# Patient Record
Sex: MALE | Race: WHITE | Employment: PART TIME | URBAN - METROPOLITAN AREA
[De-identification: names, ages, dates, MRNs, and addresses within clinical notes are randomized per-mention and may not be internally consistent; named-entity substitution may affect disease eponyms.]

---

## 2017-04-27 ENCOUNTER — TELEPHONE (OUTPATIENT)
Dept: FAMILY MEDICINE CLINIC | Age: 62
End: 2017-04-27

## 2017-05-22 ENCOUNTER — OFFICE VISIT (OUTPATIENT)
Dept: FAMILY MEDICINE CLINIC | Age: 62
End: 2017-05-22

## 2017-05-22 VITALS
HEART RATE: 70 BPM | TEMPERATURE: 98.1 F | WEIGHT: 184 LBS | DIASTOLIC BLOOD PRESSURE: 70 MMHG | HEIGHT: 70 IN | RESPIRATION RATE: 16 BRPM | SYSTOLIC BLOOD PRESSURE: 118 MMHG | BODY MASS INDEX: 26.34 KG/M2

## 2017-05-22 DIAGNOSIS — Z12.11 SCREENING FOR COLON CANCER: ICD-10-CM

## 2017-05-22 DIAGNOSIS — K21.9 GASTROESOPHAGEAL REFLUX DISEASE, ESOPHAGITIS PRESENCE NOT SPECIFIED: ICD-10-CM

## 2017-05-22 DIAGNOSIS — E78.5 HYPERLIPIDEMIA LDL GOAL <130: ICD-10-CM

## 2017-05-22 DIAGNOSIS — S29.019A THORACIC MYOFASCIAL STRAIN, INITIAL ENCOUNTER: ICD-10-CM

## 2017-05-22 DIAGNOSIS — I10 ESSENTIAL HYPERTENSION: Primary | ICD-10-CM

## 2017-05-22 DIAGNOSIS — Z72.0 TOBACCO ABUSE: ICD-10-CM

## 2017-05-22 LAB
CHOLESTEROL, TOTAL: 181 MG/DL (ref 0–199)
HDLC SERPL-MCNC: 35 MG/DL (ref 40–60)
LDL CHOLESTEROL CALCULATED: 124 MG/DL
TRIGL SERPL-MCNC: 108 MG/DL (ref 0–150)
VLDLC SERPL CALC-MCNC: 22 MG/DL

## 2017-05-22 PROCEDURE — 99214 OFFICE O/P EST MOD 30 MIN: CPT | Performed by: FAMILY MEDICINE

## 2017-05-22 RX ORDER — LISINOPRIL AND HYDROCHLOROTHIAZIDE 25; 20 MG/1; MG/1
TABLET ORAL
Qty: 90 TABLET | Refills: 1 | Status: SHIPPED | OUTPATIENT
Start: 2017-05-22 | End: 2017-09-27 | Stop reason: SDUPTHER

## 2017-05-22 RX ORDER — PANTOPRAZOLE SODIUM 40 MG/1
40 TABLET, DELAYED RELEASE ORAL DAILY
Qty: 30 TABLET | Refills: 3 | Status: SHIPPED | OUTPATIENT
Start: 2017-05-22 | End: 2021-10-08

## 2017-06-14 ENCOUNTER — TELEPHONE (OUTPATIENT)
Dept: OTHER | Facility: CLINIC | Age: 62
End: 2017-06-14

## 2017-09-27 DIAGNOSIS — I10 ESSENTIAL HYPERTENSION: ICD-10-CM

## 2017-09-27 RX ORDER — LISINOPRIL AND HYDROCHLOROTHIAZIDE 25; 20 MG/1; MG/1
TABLET ORAL
Qty: 90 TABLET | Refills: 0 | Status: SHIPPED | OUTPATIENT
Start: 2017-09-27 | End: 2018-01-04 | Stop reason: SDUPTHER

## 2017-11-27 ENCOUNTER — OFFICE VISIT (OUTPATIENT)
Dept: FAMILY MEDICINE CLINIC | Age: 62
End: 2017-11-27

## 2017-11-27 VITALS
SYSTOLIC BLOOD PRESSURE: 118 MMHG | HEART RATE: 66 BPM | RESPIRATION RATE: 16 BRPM | WEIGHT: 180 LBS | BODY MASS INDEX: 25.77 KG/M2 | DIASTOLIC BLOOD PRESSURE: 70 MMHG | HEIGHT: 70 IN | TEMPERATURE: 98.1 F

## 2017-11-27 DIAGNOSIS — Z00.00 WELL ADULT HEALTH CHECK: Primary | ICD-10-CM

## 2017-11-27 DIAGNOSIS — E78.5 HYPERLIPIDEMIA LDL GOAL <130: ICD-10-CM

## 2017-11-27 DIAGNOSIS — K21.9 GASTROESOPHAGEAL REFLUX DISEASE, ESOPHAGITIS PRESENCE NOT SPECIFIED: ICD-10-CM

## 2017-11-27 DIAGNOSIS — I10 ESSENTIAL HYPERTENSION: ICD-10-CM

## 2017-11-27 DIAGNOSIS — Z12.11 SCREENING FOR COLON CANCER: ICD-10-CM

## 2017-11-27 DIAGNOSIS — Z23 NEEDS FLU SHOT: ICD-10-CM

## 2017-11-27 DIAGNOSIS — Z72.0 TOBACCO ABUSE: ICD-10-CM

## 2017-11-27 DIAGNOSIS — E55.9 VITAMIN D DEFICIENCY: ICD-10-CM

## 2017-11-27 DIAGNOSIS — F17.219 NICOTINE DEPENDENCE, CIGARETTES, WITH UNSPECIFIED NICOTINE-INDUCED DISORDERS: ICD-10-CM

## 2017-11-27 PROCEDURE — G0296 VISIT TO DETERM LDCT ELIG: HCPCS | Performed by: FAMILY MEDICINE

## 2017-11-27 PROCEDURE — 90630 INFLUENZA, QUADV, 18-64 YRS, ID, PF, MICRO INJ, 0.1ML (FLUZONE QUADV, PF): CPT | Performed by: FAMILY MEDICINE

## 2017-11-27 PROCEDURE — 90471 IMMUNIZATION ADMIN: CPT | Performed by: FAMILY MEDICINE

## 2017-11-27 PROCEDURE — 99396 PREV VISIT EST AGE 40-64: CPT | Performed by: FAMILY MEDICINE

## 2017-11-27 ASSESSMENT — PATIENT HEALTH QUESTIONNAIRE - PHQ9
1. LITTLE INTEREST OR PLEASURE IN DOING THINGS: 0
SUM OF ALL RESPONSES TO PHQ9 QUESTIONS 1 & 2: 0
2. FEELING DOWN, DEPRESSED OR HOPELESS: 0
SUM OF ALL RESPONSES TO PHQ QUESTIONS 1-9: 0

## 2017-11-27 NOTE — PROGRESS NOTES
CHART REVIEW  Health Maintenance   Topic Date Due    Colon cancer screen colonoscopy  11/11/2005    Smoker: low dose lung CT screening  11/11/2010    Flu vaccine (1) 09/01/2017    Lipid screen  05/22/2022    DTaP/Tdap/Td vaccine (3 - Td) 07/16/2025    Zostavax vaccine  Completed    Pneumococcal med risk  Completed    Hepatitis C screen  Completed    HIV screen  Completed      Patient Active Problem List   Diagnosis    Vitamin D deficiency    Screening for colon cancer    Screening for prostate cancer    Hyperlipidemia LDL goal <130    GERD (gastroesophageal reflux disease)    Hypertension    Tobacco abuse    Well adult health check    Insomnia    Snoring    Wheezing    ED (erectile dysfunction)    Thoracic myofascial strain     Prior to Visit Medications    Medication Sig Taking? Authorizing Provider   lisinopril-hydrochlorothiazide (PRINZIDE;ZESTORETIC) 20-25 MG per tablet TAKE ONE TABLET BY MOUTH DAILY Yes Lottie Mendoza MD   pantoprazole (PROTONIX) 40 MG tablet Take 1 tablet by mouth daily Yes Chantell Haas MD   vitamin D (CHOLECALCIFEROL) 5000 UNITS CAPS capsule Take 1 capsule by mouth daily Yes Chantell Haas MD   aspirin EC 81 MG EC tablet Take 1 tablet by mouth daily.  Yes Chantell Haas MD     Cholesterol, Total   Date Value Ref Range Status   05/22/2017 181 0 - 199 mg/dL Final     LDL Calculated   Date Value Ref Range Status   05/22/2017 124 (H) <100 mg/dL Final     HDL   Date Value Ref Range Status   05/22/2017 35 (L) 40 - 60 mg/dL Final   12/23/2011 31 (L) 40 - 60 mg/dl Final     Triglycerides   Date Value Ref Range Status   05/22/2017 108 0 - 150 mg/dL Final     Lab Results   Component Value Date    GLUCOSE 91 11/22/2016     Lab Results   Component Value Date     11/22/2016    K 4.1 11/22/2016    CREATININE 0.9 11/22/2016     Lab Results   Component Value Date    WBC 8.0 01/06/2016    HGB 16.6 01/06/2016    HCT 49.4 01/06/2016    MCV 92.7 01/06/2016     01/06/2016 187 lb (84.8 kg)   06/08/16 188 lb (85.3 kg)   01/06/16 188 lb (85.3 kg)      GENERAL:   · well-developed, well-nourished, alert, no distress. EYES: glasses  · External findings: lids and lashes normal and conjunctivae and sclerae normal  · Eyes: no periorbital cellulitis. ENT:   · External nose and ears appear normal  · normal TM's and external ear canals both ears  · Pharynx: normal. Exudates: None  · Lips, mucosa, and tongue normal   · Hearing grossly normal.     NECK:   · No adenopathy, supple, symmetrical, trachea midline  · Thyroid not enlarged, symmetric, no tenderness/mass/nodules  LYMPH:  · no cervical nodes, no supraclavicular nodes  LUNGS:    · Breathing unlabored  · clear to auscultation bilaterally and good air movement  CARDIOVASC:   · regular rate and rhythm, S1, S2 normal. No murmur, click, rub or gallop  · Apical impulse normal  · LEGS:  Lower extremity edema: none    · No carotid bruits  ABDOMEN:   · Soft, non-tender, no masses  · No hepatosplenomegaly  · No hernias noted. Exam limited by N/A  SKIN: warm and dry  · No rashes or suspicious lesions  · No nodules or induration  PSYCH:    · Alert and oriented  · Normal reasoning, insight good  · Facial expressions full, mood appropriate  · No memory disturbance noted  MUSCULOSKEL:    · Gait normal, assistive device: none  · No significant finger or nail findings  · Spine symmetric, no deformities, no kyphosis      Assessment and Plan:     1. Well adult health check     2. Needs flu shot  INFLUENZA, QUADV, 18-64 YRS, ID, PF, MICRO INJ, 0.1ML (FLUZONE QUADV, PF)   3. Essential hypertension     4. Tobacco abuse     5. Vitamin D deficiency     6. Hyperlipidemia LDL goal <130     7. Gastroesophageal reflux disease, esophagitis presence not specified     8. Nicotine dependence, cigarettes, with unspecified nicotine-induced disorders  TN VISIT TO DISCUSS LUNG CA SCREEN W LDCT    CT Lung Screening   9.  Screening for colon cancer  [facility] COLONOSCOPY     RISK comorbidities resulting in life expectancy of < 10 years, or that would preclude treatment of an abnormality identified on CT, should not be screened due to lack of benefit.     To obtain maximal benefit from this screening, smoking cessation and long-term abstinence from smoking is critical        ·

## 2017-11-27 NOTE — PATIENT INSTRUCTIONS
INSTRUCTIONS  · NEXT APPOINTMENT: Please schedule fasting check-up in 6 months. OK to have water, black coffee and medications. · PLEASE TAKE THIS FORM TO CHECK-OUT WINDOW TO SCHEDULE NEXT VISIT.   · REFILL POLICY:  If not getting refills today, then PLEASE make next appointment on way out today. Will need to see that future appointment scheudled when pharmacy contacts Dr. Reggie Ash for a refill. · Get CT lung scan for lung cancer screening. · Patient advised to quit smoking and assistance in doing so was offered. Call GI to schedule colonoscopy soon for colon cancer screening and prevention. Will need to do bowel prep the day before and someone to drive home afterwards. ·    Patient Education     Low Dose CT (LDCT) Lung Screening criteria met   Age 50-69   Pack year smoking >30   Still smoking or less than 15 year since quit   No sign or symptoms of lung cancer   > 11 months since last LDCT     Risks and benefits of lung cancer screening with LDCT scans discussed:    Significance of positive screen - False-positive LDCT results often occur. 95% of all positive results do not lead to a diagnosis of cancer. Usually further imaging can resolve most false-positive results; however, some patients may require invasive procedures. Over diagnosis risk - 10% to 12% of screen-detected lung cancer cases are over diagnosedthat is, the cancer would not have been detected in the patient's lifetime without the screening. Need for follow up screens annually to continue lung cancer screening effectiveness     Risks associated with radiation from annual LDCT- Radiation exposure is about the same as for a mammogram, which is about 1/3 of the annual background radiation exposure from everyday life. Starting screening at age 54 is not likely to increase cancer risk from radiation exposure.     Patients with comorbidities resulting in life expectancy of < 10 years, or that would preclude treatment of an abnormality identified on CT, should not be screened due to lack of benefit.     To obtain maximal benefit from this screening, smoking cessation and long-term abstinence from smoking is critical

## 2017-11-27 NOTE — PROGRESS NOTES
Vaccine Information Sheet, \"Influenza - Inactivated\"  given to Fatou Trujillos, or parent/legal guardian of  Fatou Trujillos and verbalized understanding. Patient responses:    Have you ever had a reaction to a flu vaccine? No  Are you able to eat eggs without adverse effects? No  Do you have any current illness? No  Have you ever had [place] Syndrome? No    Flu vaccine given per order. Please see immunization tab.

## 2018-01-04 DIAGNOSIS — I10 ESSENTIAL HYPERTENSION: ICD-10-CM

## 2018-01-04 RX ORDER — LISINOPRIL AND HYDROCHLOROTHIAZIDE 25; 20 MG/1; MG/1
TABLET ORAL
Qty: 90 TABLET | Refills: 0 | Status: SHIPPED | OUTPATIENT
Start: 2018-01-04 | End: 2018-04-13 | Stop reason: SDUPTHER

## 2018-04-11 ENCOUNTER — TELEPHONE (OUTPATIENT)
Dept: FAMILY MEDICINE CLINIC | Age: 63
End: 2018-04-11

## 2018-04-13 DIAGNOSIS — I10 ESSENTIAL HYPERTENSION: ICD-10-CM

## 2018-04-13 RX ORDER — LISINOPRIL AND HYDROCHLOROTHIAZIDE 25; 20 MG/1; MG/1
TABLET ORAL
Qty: 90 TABLET | Refills: 0 | Status: SHIPPED | OUTPATIENT
Start: 2018-04-13 | End: 2018-07-20 | Stop reason: SDUPTHER

## 2018-04-17 ENCOUNTER — OFFICE VISIT (OUTPATIENT)
Dept: FAMILY MEDICINE CLINIC | Age: 63
End: 2018-04-17

## 2018-04-17 VITALS
SYSTOLIC BLOOD PRESSURE: 122 MMHG | OXYGEN SATURATION: 96 % | WEIGHT: 189 LBS | RESPIRATION RATE: 16 BRPM | TEMPERATURE: 98 F | HEART RATE: 60 BPM | DIASTOLIC BLOOD PRESSURE: 70 MMHG | BODY MASS INDEX: 27.06 KG/M2 | HEIGHT: 70 IN

## 2018-04-17 DIAGNOSIS — R06.83 SNORING: ICD-10-CM

## 2018-04-17 DIAGNOSIS — Z12.11 SCREENING FOR COLON CANCER: ICD-10-CM

## 2018-04-17 DIAGNOSIS — I10 ESSENTIAL HYPERTENSION: ICD-10-CM

## 2018-04-17 DIAGNOSIS — R40.0 DAYTIME SLEEPINESS: ICD-10-CM

## 2018-04-17 DIAGNOSIS — E55.9 VITAMIN D DEFICIENCY: ICD-10-CM

## 2018-04-17 DIAGNOSIS — Z72.0 TOBACCO ABUSE: ICD-10-CM

## 2018-04-17 DIAGNOSIS — E78.5 HYPERLIPIDEMIA LDL GOAL <130: ICD-10-CM

## 2018-04-17 DIAGNOSIS — Z00.00 WELL ADULT HEALTH CHECK: Primary | ICD-10-CM

## 2018-04-17 LAB
A/G RATIO: 1.9 (ref 1.1–2.2)
ALBUMIN SERPL-MCNC: 4.1 G/DL (ref 3.4–5)
ALP BLD-CCNC: 70 U/L (ref 40–129)
ALT SERPL-CCNC: 14 U/L (ref 10–40)
ANION GAP SERPL CALCULATED.3IONS-SCNC: 13 MMOL/L (ref 3–16)
AST SERPL-CCNC: 14 U/L (ref 15–37)
BILIRUB SERPL-MCNC: 0.6 MG/DL (ref 0–1)
BUN BLDV-MCNC: 16 MG/DL (ref 7–20)
CALCIUM SERPL-MCNC: 8.8 MG/DL (ref 8.3–10.6)
CHLORIDE BLD-SCNC: 104 MMOL/L (ref 99–110)
CHOLESTEROL, TOTAL: 164 MG/DL (ref 0–199)
CO2: 26 MMOL/L (ref 21–32)
CREAT SERPL-MCNC: 0.9 MG/DL (ref 0.8–1.3)
GFR AFRICAN AMERICAN: >60
GFR NON-AFRICAN AMERICAN: >60
GLOBULIN: 2.2 G/DL
GLUCOSE BLD-MCNC: 85 MG/DL (ref 70–99)
HDLC SERPL-MCNC: 30 MG/DL (ref 40–60)
LDL CHOLESTEROL CALCULATED: 114 MG/DL
POTASSIUM SERPL-SCNC: 4.2 MMOL/L (ref 3.5–5.1)
SODIUM BLD-SCNC: 143 MMOL/L (ref 136–145)
TOTAL PROTEIN: 6.3 G/DL (ref 6.4–8.2)
TRIGL SERPL-MCNC: 98 MG/DL (ref 0–150)
VITAMIN D 25-HYDROXY: 23.5 NG/ML
VLDLC SERPL CALC-MCNC: 20 MG/DL

## 2018-04-17 PROCEDURE — 99396 PREV VISIT EST AGE 40-64: CPT | Performed by: FAMILY MEDICINE

## 2018-04-17 RX ORDER — ATORVASTATIN CALCIUM 20 MG/1
20 TABLET, FILM COATED ORAL DAILY
Qty: 90 TABLET | Refills: 3 | Status: SHIPPED | OUTPATIENT
Start: 2018-04-17 | End: 2019-04-24

## 2018-04-18 DIAGNOSIS — E55.9 VITAMIN D DEFICIENCY: ICD-10-CM

## 2018-04-24 ENCOUNTER — TELEPHONE (OUTPATIENT)
Dept: FAMILY MEDICINE CLINIC | Age: 63
End: 2018-04-24

## 2018-06-12 ENCOUNTER — TELEPHONE (OUTPATIENT)
Dept: FAMILY MEDICINE CLINIC | Age: 63
End: 2018-06-12

## 2018-06-12 DIAGNOSIS — Z72.0 TOBACCO ABUSE: ICD-10-CM

## 2018-06-12 DIAGNOSIS — R06.2 WHEEZING: Primary | ICD-10-CM

## 2018-07-20 DIAGNOSIS — I10 ESSENTIAL HYPERTENSION: ICD-10-CM

## 2018-07-20 RX ORDER — LISINOPRIL AND HYDROCHLOROTHIAZIDE 25; 20 MG/1; MG/1
TABLET ORAL
Qty: 90 TABLET | Refills: 0 | Status: SHIPPED | OUTPATIENT
Start: 2018-07-20 | End: 2018-07-23

## 2018-07-22 DIAGNOSIS — I10 ESSENTIAL HYPERTENSION: ICD-10-CM

## 2018-07-23 RX ORDER — LISINOPRIL AND HYDROCHLOROTHIAZIDE 25; 20 MG/1; MG/1
TABLET ORAL
Qty: 90 TABLET | Refills: 0 | Status: SHIPPED | OUTPATIENT
Start: 2018-07-23 | End: 2019-03-12 | Stop reason: SDUPTHER

## 2018-10-08 DIAGNOSIS — K21.9 GASTROESOPHAGEAL REFLUX DISEASE, ESOPHAGITIS PRESENCE NOT SPECIFIED: Primary | ICD-10-CM

## 2018-10-08 RX ORDER — VARENICLINE TARTRATE 25 MG
KIT ORAL
Qty: 1 EACH | Refills: 0 | Status: SHIPPED | OUTPATIENT
Start: 2018-10-08 | End: 2019-04-24 | Stop reason: SDUPTHER

## 2019-03-12 DIAGNOSIS — I10 ESSENTIAL HYPERTENSION: ICD-10-CM

## 2019-03-12 RX ORDER — LISINOPRIL AND HYDROCHLOROTHIAZIDE 25; 20 MG/1; MG/1
TABLET ORAL
Qty: 90 TABLET | Refills: 0 | Status: SHIPPED | OUTPATIENT
Start: 2019-03-12 | End: 2019-04-24 | Stop reason: SDUPTHER

## 2019-04-24 ENCOUNTER — OFFICE VISIT (OUTPATIENT)
Dept: FAMILY MEDICINE CLINIC | Age: 64
End: 2019-04-24
Payer: COMMERCIAL

## 2019-04-24 VITALS
SYSTOLIC BLOOD PRESSURE: 118 MMHG | TEMPERATURE: 97.6 F | HEIGHT: 70 IN | RESPIRATION RATE: 18 BRPM | BODY MASS INDEX: 26.2 KG/M2 | DIASTOLIC BLOOD PRESSURE: 70 MMHG | HEART RATE: 57 BPM | WEIGHT: 183 LBS

## 2019-04-24 DIAGNOSIS — E55.9 VITAMIN D DEFICIENCY: ICD-10-CM

## 2019-04-24 DIAGNOSIS — Z72.0 TOBACCO ABUSE: ICD-10-CM

## 2019-04-24 DIAGNOSIS — K21.9 GASTROESOPHAGEAL REFLUX DISEASE, ESOPHAGITIS PRESENCE NOT SPECIFIED: ICD-10-CM

## 2019-04-24 DIAGNOSIS — E78.5 HYPERLIPIDEMIA LDL GOAL <130: ICD-10-CM

## 2019-04-24 DIAGNOSIS — I10 ESSENTIAL HYPERTENSION: ICD-10-CM

## 2019-04-24 DIAGNOSIS — Z00.00 WELL ADULT HEALTH CHECK: Chronic | ICD-10-CM

## 2019-04-24 DIAGNOSIS — Z00.00 WELL ADULT HEALTH CHECK: Primary | Chronic | ICD-10-CM

## 2019-04-24 PROBLEM — S29.019A THORACIC MYOFASCIAL STRAIN: Status: RESOLVED | Noted: 2017-05-22 | Resolved: 2019-04-24

## 2019-04-24 LAB
A/G RATIO: 1.5 (ref 1.1–2.2)
ALBUMIN SERPL-MCNC: 4.1 G/DL (ref 3.4–5)
ALP BLD-CCNC: 81 U/L (ref 40–129)
ALT SERPL-CCNC: 16 U/L (ref 10–40)
ANION GAP SERPL CALCULATED.3IONS-SCNC: 12 MMOL/L (ref 3–16)
AST SERPL-CCNC: 13 U/L (ref 15–37)
BASOPHILS ABSOLUTE: 0 K/UL (ref 0–0.2)
BASOPHILS RELATIVE PERCENT: 0.5 %
BILIRUB SERPL-MCNC: 0.4 MG/DL (ref 0–1)
BUN BLDV-MCNC: 18 MG/DL (ref 7–20)
CALCIUM SERPL-MCNC: 9 MG/DL (ref 8.3–10.6)
CHLORIDE BLD-SCNC: 102 MMOL/L (ref 99–110)
CHOLESTEROL, TOTAL: 145 MG/DL (ref 0–199)
CO2: 29 MMOL/L (ref 21–32)
CREAT SERPL-MCNC: 0.8 MG/DL (ref 0.8–1.3)
EOSINOPHILS ABSOLUTE: 0.1 K/UL (ref 0–0.6)
EOSINOPHILS RELATIVE PERCENT: 1.7 %
GFR AFRICAN AMERICAN: >60
GFR NON-AFRICAN AMERICAN: >60
GLOBULIN: 2.8 G/DL
GLUCOSE BLD-MCNC: 88 MG/DL (ref 70–99)
HCT VFR BLD CALC: 44.6 % (ref 40.5–52.5)
HDLC SERPL-MCNC: 27 MG/DL (ref 40–60)
HEMOGLOBIN: 15.2 G/DL (ref 13.5–17.5)
LDL CHOLESTEROL CALCULATED: 100 MG/DL
LYMPHOCYTES ABSOLUTE: 3.1 K/UL (ref 1–5.1)
LYMPHOCYTES RELATIVE PERCENT: 43.3 %
MCH RBC QN AUTO: 31.4 PG (ref 26–34)
MCHC RBC AUTO-ENTMCNC: 34.2 G/DL (ref 31–36)
MCV RBC AUTO: 91.8 FL (ref 80–100)
MONOCYTES ABSOLUTE: 0.5 K/UL (ref 0–1.3)
MONOCYTES RELATIVE PERCENT: 7.2 %
NEUTROPHILS ABSOLUTE: 3.3 K/UL (ref 1.7–7.7)
NEUTROPHILS RELATIVE PERCENT: 47.3 %
PDW BLD-RTO: 13.5 % (ref 12.4–15.4)
PLATELET # BLD: 236 K/UL (ref 135–450)
PMV BLD AUTO: 8.7 FL (ref 5–10.5)
POTASSIUM SERPL-SCNC: 3.9 MMOL/L (ref 3.5–5.1)
RBC # BLD: 4.85 M/UL (ref 4.2–5.9)
SODIUM BLD-SCNC: 143 MMOL/L (ref 136–145)
TOTAL PROTEIN: 6.9 G/DL (ref 6.4–8.2)
TRIGL SERPL-MCNC: 88 MG/DL (ref 0–150)
VITAMIN D 25-HYDROXY: 24.1 NG/ML
VLDLC SERPL CALC-MCNC: 18 MG/DL
WBC # BLD: 7.1 K/UL (ref 4–11)

## 2019-04-24 PROCEDURE — 99396 PREV VISIT EST AGE 40-64: CPT | Performed by: FAMILY MEDICINE

## 2019-04-24 RX ORDER — VARENICLINE TARTRATE 25 MG
KIT ORAL
Qty: 1 BOX | Refills: 0 | Status: SHIPPED | OUTPATIENT
Start: 2019-04-24 | End: 2020-10-08 | Stop reason: ALTCHOICE

## 2019-04-24 RX ORDER — ATORVASTATIN CALCIUM 10 MG/1
10 TABLET, FILM COATED ORAL DAILY
Qty: 30 TABLET | Refills: 5 | Status: SHIPPED | OUTPATIENT
Start: 2019-04-24 | End: 2019-11-15

## 2019-04-24 RX ORDER — LISINOPRIL AND HYDROCHLOROTHIAZIDE 25; 20 MG/1; MG/1
TABLET ORAL
Qty: 90 TABLET | Refills: 1 | Status: SHIPPED | OUTPATIENT
Start: 2019-04-24 | End: 2019-09-22 | Stop reason: SDUPTHER

## 2019-04-24 ASSESSMENT — PATIENT HEALTH QUESTIONNAIRE - PHQ9
SUM OF ALL RESPONSES TO PHQ QUESTIONS 1-9: 0
SUM OF ALL RESPONSES TO PHQ QUESTIONS 1-9: 0
2. FEELING DOWN, DEPRESSED OR HOPELESS: 0
1. LITTLE INTEREST OR PLEASURE IN DOING THINGS: 0
SUM OF ALL RESPONSES TO PHQ9 QUESTIONS 1 & 2: 0

## 2019-04-24 NOTE — PROGRESS NOTES
PHYSICAL-VISIT NOTE   Subjective:     Chief Complaint   Patient presents with    Annual Exam       Viry Huerta is a 61 y.o. male who presents for annual testing/preventive review and check-up of medical problems listed below:  1. Well adult health check    2. Tobacco abuse - had gotten down to 2 cig/d with chantix but then stress and back up to 0.35 PPD   3. Vitamin D deficiency    4. Essential hypertension    5. Hyperlipidemia LDL goal <130    6. Gastroesophageal reflux disease, esophagitis presence not specified        Complaints: no issues at this time   Pt not taking asprin or atorvastatin he said so I took them off his list. He doesn't think he ever took atorvastatin,     Had congestion and cough for several days but better. Had sneeze as well. * Documentation provided by medical assistant reviewed and updated by provider. HISTORY:  Patient's medications, allergies, past medical, and social histories were reviewed and updated as appropriate. CHART REVIEW  Health Maintenance   Topic Date Due    Potassium monitoring  04/17/2019    Creatinine monitoring  04/17/2019    Shingles Vaccine (2 of 3) 04/24/2020 (Originally 9/10/2015)    Flu vaccine (Season Ended) 09/01/2019    Lipid screen  04/17/2023    DTaP/Tdap/Td vaccine (3 - Td) 07/16/2025    Colon cancer screen colonoscopy  11/12/2028    Pneumococcal 0-64 years Vaccine  Completed    Hepatitis C screen  Completed    HIV screen  Completed     The 10-year ASCVD risk score (Quintin Haddad, et al., 2013) is: 18.9%    Values used to calculate the score:      Age: 61 years      Sex: Male      Is Non- : No      Diabetic: No      Tobacco smoker: Yes      Systolic Blood Pressure: 997 mmHg      Is BP treated: Yes      HDL Cholesterol: 30 mg/dL      Total Cholesterol: 164 mg/dL  Prior to Visit Medications    Medication Sig Taking?  Authorizing Provider   lisinopril-hydrochlorothiazide (PRINZIDE;ZESTORETIC) 20-25 MG per tablet TAKE ONE TABLET BY MOUTH DAILY Yes Richardson Amaya MD   varenicline (CHANTIX STARTING MONTH PAK) 0.5 MG X 11 & 1 MG X 42 tablet Take by mouth. Yes Richardson Amaya MD   pantoprazole (PROTONIX) 40 MG tablet Take 1 tablet by mouth daily Yes Richardson Amaya MD      Family History   Problem Relation Age of Onset    Cancer Mother         skin    Heart Failure Father     Lung Cancer Brother 61    Heart Surgery Sister      Social History     Tobacco Use    Smoking status: Current Some Day Smoker     Packs/day: 0.50     Years: 40.00     Pack years: 20.00     Types: Cigarettes     Start date: 1/1/1964    Smokeless tobacco: Never Used    Tobacco comment: advised to quit, has been cutting back; formerly 1 PPD   Substance Use Topics    Alcohol use:  Yes     Alcohol/week: 0.0 oz     Comment: occassional up to 5 at a time    Drug use: No      LAST LABS  Cholesterol, Total   Date Value Ref Range Status   04/17/2018 164 0 - 199 mg/dL Final     LDL Calculated   Date Value Ref Range Status   04/17/2018 114 (H) <100 mg/dL Final     HDL   Date Value Ref Range Status   04/17/2018 30 (L) 40 - 60 mg/dL Final   12/23/2011 31 (L) 40 - 60 mg/dl Final     Triglycerides   Date Value Ref Range Status   04/17/2018 98 0 - 150 mg/dL Final     Lab Results   Component Value Date    GLUCOSE 85 04/17/2018     Lab Results   Component Value Date     04/17/2018    K 4.2 04/17/2018    CREATININE 0.9 04/17/2018     Lab Results   Component Value Date    WBC 8.0 01/06/2016    HGB 16.6 01/06/2016    HCT 49.4 01/06/2016    MCV 92.7 01/06/2016     01/06/2016     Lab Results   Component Value Date    ALT 14 04/17/2018    AST 14 (L) 04/17/2018    ALKPHOS 70 04/17/2018    BILITOT 0.6 04/17/2018     TSH (uIU/mL)   Date Value   01/06/2016 1.35     No results found for: LABA1C  Objective:   PHYSICAL EXAM   /70 (Site: Right Upper Arm, Position: Sitting, Cuff Size: Large Adult)   Pulse 57   Temp 97.6 °F (36.4 °C) (Oral)   Resp 18   Ht 5' 10\" (1.778 m)   Wt 183 lb (83 kg)   BMI 26.26 kg/m²   BP Readings from Last 5 Encounters:   04/24/19 118/70   04/17/18 122/70   11/27/17 118/70   05/22/17 118/70   11/22/16 110/64     Wt Readings from Last 5 Encounters:   04/24/19 183 lb (83 kg)   04/17/18 189 lb (85.7 kg)   11/27/17 180 lb (81.6 kg)   05/22/17 184 lb (83.5 kg)   11/22/16 187 lb (84.8 kg)      GENERAL:   · well-developed, well-nourished, alert, no distress. EYES:   · External findings: lids and lashes normal and conjunctivae and sclerae normal  · Eyes: no periorbital cellulitis. ENT:   · External nose and ears appear normal  · normal TM's and external ear canals both ears  · Pharynx: normal. Exudates: None  · Lips, mucosa, and tongue normal  · Hearing grossly normal.     NECK:   · Supple, symmetrical, trachea midline  · Thyroid not enlarged, symmetric, no tenderness/mass/nodules  LYMPH:  · no cervical nodes, no supraclavicular nodes  LUNGS:    · Breathing unlabored  · clear to auscultation bilaterally and good air movement  CARDIOVASC:   · regular rate and rhythm, S1, S2 normal. No murmur, click, rub or gallop  · Apical impulse normal  · LEGS:  Lower extremity edema: none    · No carotid bruits  ABDOMEN:   · Soft, non-tender, no masses  · No hepatosplenomegaly  · No hernias noted. Exam limited by N/A  SKIN: warm and dry  · No rashes or suspicious lesions  · No nodules or induration  PSYCH:    · Alert and oriented  · Normal reasoning, insight good  · Facial expressions full, mood appropriate  · No memory disturbance noted  MUSCULOSKEL:    · Gait normal, assistive device: none  · No significant finger or nail findings  · Spine symmetric, no deformities, no kyphosis      Assessment and Plan:      Diagnosis Orders   1. Well adult health check  CBC Auto Differential   2. Tobacco abuse  varenicline (CHANTIX STARTING MONTH PAK) 0.5 MG X 11 & 1 MG X 42 tablet    CBC Auto Differential   3. Vitamin D deficiency  Vitamin D 25 Hydroxy   4.  Essential hypertension COMPREHENSIVE METABOLIC PANEL    lisinopril-hydrochlorothiazide (PRINZIDE;ZESTORETIC) 20-25 MG per tablet   5. Hyperlipidemia LDL goal <130  COMPREHENSIVE METABOLIC PANEL    LIPID PANEL   6. Gastroesophageal reflux disease, esophagitis presence not specified     Stable. Plan as above and below. INSTRUCTIONS  · NEXT APPOINTMENT: Please schedule check-up in 6 months. · PLEASE TAKE THIS FORM TO CHECK-OUT WINDOW TO SCHEDULE NEXT VISIT. · PLEASE GET BLOODWORK DRAWN TODAY ON FIRST FLOOR in 170. Take orders with you. RESULTS- most blood tests back in couple days. We will call you if any problems. If bloodwork good, you will get letter in mail or notified thru [address] (if signed up) within 2 weeks. If you do not, please call office. · Please get flu vaccine when available in fall. Can get either at this office or at stores such as [employer]. May take Mucinex (guaifenisen) and Robitussin DM (guafenisen, dextromethorphan) for cough and congestion. May also try Vicks Vaporub. · AVOID decongestants like phenylephrine and pseudoephedrine. AVOID Afrin. Start atorvastatin once in AM to reduce cardiac risk.  (FYI: Atorvastatin all doses are FREE at [facility])

## 2019-04-24 NOTE — PATIENT INSTRUCTIONS
INSTRUCTIONS  · NEXT APPOINTMENT: Please schedule check-up in 6 months. · PLEASE TAKE THIS FORM TO CHECK-OUT WINDOW TO SCHEDULE NEXT VISIT. · PLEASE GET BLOODWORK DRAWN TODAY ON FIRST FLOOR in 170. Take orders with you. RESULTS- most blood tests back in couple days. We will call you if any problems. If bloodwork good, you will get letter in mail or notified thru [address] (if signed up) within 2 weeks. If you do not, please call office. · Please get flu vaccine when available in fall. Can get either at this office or at stores such as [employer]. May take Mucinex (guaifenisen) and Robitussin DM (guafenisen, dextromethorphan) for cough and congestion. May also try Vicks Vaporub. · AVOID decongestants like phenylephrine and pseudoephedrine. AVOID Afrin. Start atorvastatin once in AM to reduce cardiac risk. (FYI: Atorvastatin all doses are FREE at [facility])  Patient Education   STRESS: HOW TO BETTER COPE    What causes stress? Feelings of stress are caused by the body's instinct to defend itself. This instinct is good in emergencies, such as getting out of the way of a speeding car. But stress can cause unhealthy physical symptoms if it goes on for too long, such as in response to life's daily challenges and changes. When this happens, it's as though your body gets ready to jump out of the way of the car, but you're sitting still. Your body is working overtime, with no place to put all the extra energy. This can make you feel anxious, afraid, worried and uptight. What changes may be stressful? Any sort of change can make you feel stressed, even good change. It's not just the change or event itself, but also how you react to it that matters. What's stressful is different for each person. For example, one person may feel stressed by retiring from work, while someone else may not.   Other things that may be stressful include being laid off from your job, your child leaving or returning home, the death of your spouse, divorce or marriage, an illness, an injury, a job promotion, money problems, moving, or having a baby. Can stress hurt my health? Stress can cause health problems or make health problems worse. Talk to your family doctor if you think some of your symptoms are caused by stress. It's important to make sure that your symptoms aren't caused by other health problems. Possible signs of stress  Anxiety   Back pain   Constipation or diarrhea   Depression   Fatigue   Headaches   High blood pressure   Trouble sleeping or insomnia   Problems with relationships   Shortness of breath   Stiff neck or jaw   Upset stomach   Weight gain or loss     What can I do to manage my stress? The first step is to learn to recognize when you're feeling stressed. Early warning signs of stress include tension in your shoulders and neck, or clenching your hands into fists. The next step is to choose a way to deal with your stress. One way is to avoid the event or thing that leads to your stress--but often this is not possible. A second way is to change how you react to stress. This is often the more practical way. Tips for dealing with stress  Don't worry about things you can't control, such as the weather. Solve the little problems. This can help you gain a feeling of control. Prepare to the best of your ability for events you know may be stressful, such as a job interview. Try to look at change as a positive challenge, not as a threat. Work to resolve conflicts with other people. Talk with a trusted friend, family member or counselor. Set realistic goals at home and at work. Avoid overscheduling. Exercise on a regular basis. Eat regular, well-balanced meals and get enough sleep. Meditate. Participate in something you don't find stressful, such as sports, social events or hobbies. Why is exercise useful?   Exercise is a good way to deal with stress because it's a healthy way to relieve your pent-up energy and tension. Exercise is known to release feel-good brain chemicals. It also helps you get in better shape, which makes you feel better overall. Steps to deep breathing  Lie down on a flat surface. Place a hand on your stomach, just above your navel. Place the other hand on your chest.   Breathe in slowly and try to make your stomach rise a little. Hold your breath for a second. Breathe out slowly and let your stomach go back down. What is meditation? Meditation is a form of guided thought. It can take many forms. You can do it with exercise that uses the same motions over and over, like walking or swimming. You can meditate by practicing relaxation training, by stretching or by breathing deeply. Relaxation training is simple. Start with one muscle. Hold it tight for a few seconds then relax the muscle. Do this with each of your muscles, beginning with the toes and feet and working your way up through the rest of your body, one muscle group at a time. Stretching can also help relieve tension. Roll your head in a gentle [place]. Reach toward the ceiling and bend side to side slowly. Roll your shoulders. Deep, relaxed breathing by itself may help relieve stress (see the box to the right). This helps you get plenty of oxygen and activates the relaxation response, the bodys antidote to stress. What Happens to Your Body After You Quit Smoking? Smoking is an addiction and your body makes changes when it tries to overcome an addiction. These changes can be immediate and uncomfortable, such as symptoms of withdrawal, while others cause improvements in overall health and well-being. Withdrawal   When you first quit smoking you will experience withdrawal symptoms because your body is no longer receiving the amount of nicotine it was used to.  Symptoms of withdrawal usually begin within hours of your last cigarette, peaking about two to three days later, and can continue for a few days or up to several weeks. Symptoms of nicotine withdrawal include anxiety, irritability, restlessness, difficulty concentrating, strong cravings, depressed mood, frustration or anger, increased appetite, insomnia, cough, chest tightness and constipation or diarrhea. Early Effects  Short-term benefits of quitting include decreased heart rate and blood pressure within 20 minutes of quitting, carbon monoxide blood levels drop to normal after 12 hours, circulation improves and lung function increases two to three weeks after your quit date. During the first nine months smoke free, your coughing and shortness of breath will improve, and the tiny cilia hairs in your lungs will regain function, allowing your body to handle mucus and clean your lungs, reducing your risk of infections. Also, you will begin to experience the symptoms of nicotine withdrawal.    Long -Term Effects  During the next 15 years, your body will begin to repair itself and your health will improve. After year one, your chance of coronary heart disease is reduced by 50 percent. After 5 years your risk of stroke becomes equal to that of a non-smoker and after 10 years your risk of lung cancer among other cancers is reduced by about 50 percent. Fifteen years after you quit your risk of coronary heart disease becomes equal to that of a nonsmoker. Quitting will help to slow the onset of wrinkles, and will make you much less likely to experience the premature aging of your skin and yellowing of your teeth and fingers that are characteristics of aging smokers. Longevity  Woman on hospital bed [address] [facility]. Young/[employer]   It has been estimated that as a smoker you lose close to a decade and a half of your life to your addiction.  If you quit by the age of 27, you may halt the health risks associated with smoking, and if you quit before 50 you drastically reduce your chances of dying early as a result of cigarettes. People who continue to smoke suffer from diseases such as chronic bronchitis, emphysema, heart attacks, strokes and cancer. Quality of Life  All of the diseases caused by cigarette smoking negatively impact your quality of life, long before you die. Your body becomes limited in what it can do. Smoking makes it harder to breathe, get around, work or play. Therefore, after you quit your quality of life will improve because your body no longer be limited by the health problems incurred as a result of your addiction.

## 2019-04-30 DIAGNOSIS — E55.9 VITAMIN D DEFICIENCY: Primary | ICD-10-CM

## 2019-05-03 ENCOUNTER — TELEPHONE (OUTPATIENT)
Dept: FAMILY MEDICINE CLINIC | Age: 64
End: 2019-05-03

## 2019-05-03 NOTE — TELEPHONE ENCOUNTER
Pt called said he already spoke to someone earlier in the week in regards to test results and said someone keeps calling him and not sure why

## 2019-09-22 DIAGNOSIS — I10 ESSENTIAL HYPERTENSION: ICD-10-CM

## 2019-09-24 RX ORDER — LISINOPRIL AND HYDROCHLOROTHIAZIDE 25; 20 MG/1; MG/1
TABLET ORAL
Qty: 90 TABLET | Refills: 0 | Status: SHIPPED | OUTPATIENT
Start: 2019-09-24 | End: 2019-11-15 | Stop reason: SDUPTHER

## 2019-11-15 ENCOUNTER — OFFICE VISIT (OUTPATIENT)
Dept: FAMILY MEDICINE CLINIC | Age: 64
End: 2019-11-15
Payer: COMMERCIAL

## 2019-11-15 ENCOUNTER — TELEPHONE (OUTPATIENT)
Dept: FAMILY MEDICINE CLINIC | Age: 64
End: 2019-11-15

## 2019-11-15 VITALS
WEIGHT: 186.36 LBS | BODY MASS INDEX: 26.68 KG/M2 | TEMPERATURE: 97.9 F | HEIGHT: 70 IN | HEART RATE: 68 BPM | RESPIRATION RATE: 16 BRPM | DIASTOLIC BLOOD PRESSURE: 74 MMHG | SYSTOLIC BLOOD PRESSURE: 116 MMHG | OXYGEN SATURATION: 94 %

## 2019-11-15 DIAGNOSIS — E55.9 VITAMIN D DEFICIENCY: ICD-10-CM

## 2019-11-15 DIAGNOSIS — I10 ESSENTIAL HYPERTENSION: Primary | ICD-10-CM

## 2019-11-15 DIAGNOSIS — Z23 NEEDS FLU SHOT: ICD-10-CM

## 2019-11-15 DIAGNOSIS — I10 ESSENTIAL HYPERTENSION: ICD-10-CM

## 2019-11-15 DIAGNOSIS — Z72.0 TOBACCO ABUSE: ICD-10-CM

## 2019-11-15 DIAGNOSIS — E78.6 LOW HDL (UNDER 40): ICD-10-CM

## 2019-11-15 DIAGNOSIS — K21.9 GASTROESOPHAGEAL REFLUX DISEASE, ESOPHAGITIS PRESENCE NOT SPECIFIED: ICD-10-CM

## 2019-11-15 PROCEDURE — G8419 CALC BMI OUT NRM PARAM NOF/U: HCPCS | Performed by: FAMILY MEDICINE

## 2019-11-15 PROCEDURE — 3017F COLORECTAL CA SCREEN DOC REV: CPT | Performed by: FAMILY MEDICINE

## 2019-11-15 PROCEDURE — G8484 FLU IMMUNIZE NO ADMIN: HCPCS | Performed by: FAMILY MEDICINE

## 2019-11-15 PROCEDURE — 99214 OFFICE O/P EST MOD 30 MIN: CPT | Performed by: FAMILY MEDICINE

## 2019-11-15 PROCEDURE — 90686 IIV4 VACC NO PRSV 0.5 ML IM: CPT | Performed by: FAMILY MEDICINE

## 2019-11-15 PROCEDURE — 4004F PT TOBACCO SCREEN RCVD TLK: CPT | Performed by: FAMILY MEDICINE

## 2019-11-15 PROCEDURE — 90471 IMMUNIZATION ADMIN: CPT | Performed by: FAMILY MEDICINE

## 2019-11-15 PROCEDURE — G8428 CUR MEDS NOT DOCUMENT: HCPCS | Performed by: FAMILY MEDICINE

## 2019-11-15 RX ORDER — OMEPRAZOLE 20 MG/1
20 CAPSULE, DELAYED RELEASE ORAL DAILY
Qty: 90 CAPSULE | Refills: 1 | Status: SHIPPED | OUTPATIENT
Start: 2019-11-15 | End: 2020-04-29

## 2019-11-15 RX ORDER — LISINOPRIL AND HYDROCHLOROTHIAZIDE 25; 20 MG/1; MG/1
TABLET ORAL
Qty: 90 TABLET | Refills: 1 | Status: SHIPPED | OUTPATIENT
Start: 2019-11-15 | End: 2019-11-15 | Stop reason: SDUPTHER

## 2019-11-15 RX ORDER — LISINOPRIL AND HYDROCHLOROTHIAZIDE 25; 20 MG/1; MG/1
1 TABLET ORAL DAILY
Qty: 90 TABLET | Refills: 1 | Status: SHIPPED | OUTPATIENT
Start: 2019-11-15 | End: 2020-04-29

## 2019-11-15 RX ORDER — OMEPRAZOLE 20 MG/1
20 CAPSULE, DELAYED RELEASE ORAL DAILY
COMMUNITY
End: 2019-11-15 | Stop reason: SDUPTHER

## 2020-04-28 ENCOUNTER — E-VISIT (OUTPATIENT)
Dept: FAMILY MEDICINE CLINIC | Age: 65
End: 2020-04-28
Payer: COMMERCIAL

## 2020-04-28 PROCEDURE — 99421 OL DIG E/M SVC 5-10 MIN: CPT | Performed by: FAMILY MEDICINE

## 2020-04-29 ENCOUNTER — TELEPHONE (OUTPATIENT)
Dept: PRIMARY CARE CLINIC | Age: 65
End: 2020-04-29

## 2020-04-29 RX ORDER — PERMETHRIN 50 MG/G
CREAM TOPICAL
Qty: 1 TUBE | Refills: 1 | Status: SHIPPED | OUTPATIENT
Start: 2020-04-29 | End: 2020-10-08 | Stop reason: ALTCHOICE

## 2020-04-29 RX ORDER — PREDNISONE 10 MG/1
TABLET ORAL
Qty: 39 TABLET | Refills: 0 | Status: SHIPPED | OUTPATIENT
Start: 2020-04-29 | End: 2020-05-11

## 2020-04-29 NOTE — PROGRESS NOTES
E-VISIT Assessment and Plan:      Diagnosis Orders   1. Dermatitis       Sending in cream to treat to cover scabies. Sending in oral steroids to calm rash down. Hallmark of scabies is that it is extremely itchy to point of scratching in sleep. If you get clusters of blisters associates with burning going up leg please let me know. Could be shingles. Please be seen if not improving next week. Prior to Visit Medications    Medication Sig Taking? Authorizing Provider   omeprazole (PRILOSEC) 20 MG delayed release capsule Take 1 capsule by mouth daily  Edwin Barron MD   vitamin D (CHOLECALCIFEROL) 250 MCG (97259 UT) CAPS capsule Take 1 capsule by mouth daily  Edwin Barron MD   lisinopril-hydrochlorothiazide (PRINZIDE;ZESTORETIC) 20-25 MG per tablet Take 1 tablet by mouth daily Daily  Edwin Barron MD   varenicline (CHANTIX STARTING MONTH PAK) 0.5 MG X 11 & 1 MG X 42 tablet Take by mouth.   Edwin Barron MD   pantoprazole (PROTONIX) 40 MG tablet Take 1 tablet by mouth daily  Edwin Barron MD     Patient Active Problem List   Diagnosis    Vitamin D deficiency    Hyperlipidemia LDL goal <130    GERD (gastroesophageal reflux disease)    Essential hypertension    Tobacco abuse    Insomnia    Snoring    ED (erectile dysfunction)    Low HDL (under 40)      Allergies   Allergen Reactions    Atorvastatin Other (See Comments)     Loss appetite    Chantix [Varenicline Tartrate] Nausea And Vomiting

## 2020-05-15 ENCOUNTER — VIRTUAL VISIT (OUTPATIENT)
Dept: FAMILY MEDICINE CLINIC | Age: 65
End: 2020-05-15
Payer: COMMERCIAL

## 2020-05-15 VITALS
OXYGEN SATURATION: 95 % | HEART RATE: 76 BPM | TEMPERATURE: 97.6 F | WEIGHT: 183 LBS | HEIGHT: 70 IN | SYSTOLIC BLOOD PRESSURE: 105 MMHG | BODY MASS INDEX: 26.2 KG/M2 | DIASTOLIC BLOOD PRESSURE: 62 MMHG

## 2020-05-15 PROCEDURE — G8419 CALC BMI OUT NRM PARAM NOF/U: HCPCS | Performed by: NURSE PRACTITIONER

## 2020-05-15 PROCEDURE — 3017F COLORECTAL CA SCREEN DOC REV: CPT | Performed by: NURSE PRACTITIONER

## 2020-05-15 PROCEDURE — 99214 OFFICE O/P EST MOD 30 MIN: CPT | Performed by: NURSE PRACTITIONER

## 2020-05-15 PROCEDURE — G8427 DOCREV CUR MEDS BY ELIG CLIN: HCPCS | Performed by: NURSE PRACTITIONER

## 2020-05-15 PROCEDURE — 4004F PT TOBACCO SCREEN RCVD TLK: CPT | Performed by: NURSE PRACTITIONER

## 2020-05-15 RX ORDER — TRIAMCINOLONE ACETONIDE 1 MG/G
CREAM TOPICAL
Qty: 45 G | Refills: 0 | Status: SHIPPED | OUTPATIENT
Start: 2020-05-15 | End: 2020-10-08 | Stop reason: ALTCHOICE

## 2020-05-15 RX ORDER — DOXYCYCLINE HYCLATE 100 MG
100 TABLET ORAL 2 TIMES DAILY
Qty: 20 TABLET | Refills: 0 | Status: SHIPPED | OUTPATIENT
Start: 2020-05-15 | End: 2020-05-25

## 2020-05-15 RX ORDER — FLUTICASONE PROPIONATE 50 MCG
2 SPRAY, SUSPENSION (ML) NASAL DAILY
Qty: 1 BOTTLE | Refills: 0 | Status: SHIPPED | OUTPATIENT
Start: 2020-05-15 | End: 2022-04-21 | Stop reason: SDUPTHER

## 2020-05-15 ASSESSMENT — PATIENT HEALTH QUESTIONNAIRE - PHQ9
SUM OF ALL RESPONSES TO PHQ QUESTIONS 1-9: 0
2. FEELING DOWN, DEPRESSED OR HOPELESS: 0
SUM OF ALL RESPONSES TO PHQ QUESTIONS 1-9: 0
1. LITTLE INTEREST OR PLEASURE IN DOING THINGS: 0
SUM OF ALL RESPONSES TO PHQ9 QUESTIONS 1 & 2: 0

## 2020-05-15 ASSESSMENT — ENCOUNTER SYMPTOMS
SINUS PRESSURE: 1
SINUS PAIN: 1
SORE THROAT: 1
RHINORRHEA: 1
WHEEZING: 0
SHORTNESS OF BREATH: 0
COUGH: 0
ABDOMINAL PAIN: 0
TROUBLE SWALLOWING: 0

## 2020-05-15 NOTE — PROGRESS NOTES
5/15/2020    TELEHEALTH EVALUATION -- Audio/Visual (During UQTZY-04 public health emergency)    HPI:    Maryjane Grimes (:  1955) has requested an audio/video evaluation for the following concern(s):  Chief Complaint   Patient presents with    Sinus Problem     sinus pressure, headache, drainage in back of throat- over 1 week    Rash     rash on ankle won't go away. Patient reports that for the past week with sinus pressure, headache, rhinorrhea, PND, sore throat. Denies fever, cough, shortness of breath. Sinus drainage is tan. Has tried allegra with slight improvement in headache. Patient reports that he has a rash on his left inner ankle. Dr. González Muñoz gave him prednisone and elimite cream. Area did have some blisters on it and now those are resolved. The redness was improved when taking the prednisone. Reports that his wife does not have a rash. Before calling Dr. González Muñoz tried OTC antifungal cream without improvement in symptoms. Denies changing creams, soaps, medications, foods. Review of Systems   Constitutional: Positive for fatigue. Negative for activity change and fever. HENT: Positive for congestion, postnasal drip, rhinorrhea, sinus pressure, sinus pain and sore throat. Negative for ear pain and trouble swallowing. Respiratory: Negative for cough, shortness of breath and wheezing. Cardiovascular: Negative for chest pain. Gastrointestinal: Negative for abdominal pain. Skin: Positive for rash. Neurological: Positive for headaches. Negative for dizziness. Prior to Visit Medications    Medication Sig Taking? Authorizing Provider   lisinopril-hydroCHLOROthiazide (PRINZIDE;ZESTORETIC) 20-25 MG per tablet TAKE ONE TABLET BY MOUTH DAILY Yes Mary Lainez MD   vitamin D (CHOLECALCIFEROL) 250 MCG (15427 UT) CAPS capsule Take 1 capsule by mouth daily Yes Mary Lainez MD   permethrin (ELIMITE) 5 % cream Apply topically once. Wash in 12 hours.  Repeat in one contact this office for worsening conditions or problems, and seek emergency medical treatment and/or call 911 if deemed necessary. Patient identification was verified at the start of the visit: Yes    Total time spent on this encounter: 25 minutes    Services were provided through a video synchronous discussion virtually to substitute for in-person clinic visit. Patient and provider were located at their individual homes. --MARIUSZ Lazo CNP on 5/15/2020 at 3:45 PM    An electronic signature was used to authenticate this note.

## 2020-08-03 RX ORDER — LISINOPRIL AND HYDROCHLOROTHIAZIDE 25; 20 MG/1; MG/1
1 TABLET ORAL DAILY
Qty: 90 TABLET | Refills: 0 | Status: SHIPPED | OUTPATIENT
Start: 2020-08-03 | End: 2020-10-27

## 2020-09-09 ENCOUNTER — TELEPHONE (OUTPATIENT)
Dept: FAMILY MEDICINE CLINIC | Age: 65
End: 2020-09-09

## 2020-09-09 ENCOUNTER — NURSE TRIAGE (OUTPATIENT)
Dept: OTHER | Facility: CLINIC | Age: 65
End: 2020-09-09

## 2020-09-09 NOTE — TELEPHONE ENCOUNTER
All scheduled are at 100 today. .  Pt is scheduled with you for tomorrow. Anything else you would like him to do? Or should he go to the ER sooner?

## 2020-09-09 NOTE — TELEPHONE ENCOUNTER
Pt called in states his right leg is going numb down to his foot pt has had some nausea and has some back pain and stomach pain started yesterday   Per ma advises the pt to go to the ER if it gets worse   I did schedule the pt tomorrow with Dr Romeo

## 2020-09-09 NOTE — TELEPHONE ENCOUNTER
Called pt and relayed message per Dr Day  He denies numbness in associated arm   Also states leg numbness has resolved  Understands if this develops that he should go to ED

## 2020-09-09 NOTE — TELEPHONE ENCOUNTER
Reason for Disposition   Age > 60 years    Answer Assessment - Initial Assessment Questions  1. LOCATION: \"Where does it hurt? \"       Feels the pain in abdomen above naval. Back is across the lumbar area  2. RADIATION: \"Does the pain shoot anywhere else? \" (e.g., chest, back)    3. ONSET: \"When did the pain begin? \" (Minutes, hours or days ago)     Woke up today with the pain  4. SUDDEN: \"Gradual or sudden onset? \"  Came on suddenly  5. PATTERN \"Does the pain come and go, or is it constant? \"     - If constant: \"Is it getting better, staying the same, or worsening? \"       (Note: Constant means the pain never goes away completely; most serious pain is constant and it progresses)      - If intermittent: \"How long does it last?\" \"Do you have pain now? \"      (Note: Intermittent means the pain goes away completely between bouts)  Has eased up but can still feel the pain  6. SEVERITY: \"How bad is the pain? \"  (e.g., Scale 1-10; mild, moderate, or severe)     - MILD (1-3): doesn't interfere with normal activities, abdomen soft and not tender to touch      - MODERATE (4-7): interferes with normal activities or awakens from sleep, tender to touch      - SEVERE (8-10): excruciating pain, doubled over, unable to do any normal activities    Pain level this morning was an 8/10, now has gone  7. RECURRENT SYMPTOM: \"Have you ever had this type of abdominal pain before? \" If so, ask: \"When was the last time? \" and \"What happened that time?\"     8. CAUSE: \"What do you think is causing the abdominal pain?\"       9. RELIEVING/AGGRAVATING FACTORS: \"What makes it better or worse? \" (e.g., movement, antacids, bowel movement)     Took ibuprofen and has eased up  10. OTHER SYMPTOMS: \"Has there been any vomiting, diarrhea, constipation, or urine problems? \"  Some nausea    Protocols used: ABDOMINAL PAIN - MALE-ADULT-[place]  Received call from [facility]. Pt calling with abdominal pain that started this morning.  Pain was an 8/10, took ibuprofen and pain has went down. Also having back pain. Some nausea. No other symptoms. Recommend pt is seen today, call sooner or ED if symptoms worsens. Call soft transferred to [place] in [address] to schedule appointment. Please do not reply to the triage nurse through this encounter. Any subsequent communication should be directly with the patient.

## 2020-09-10 ENCOUNTER — OFFICE VISIT (OUTPATIENT)
Dept: FAMILY MEDICINE CLINIC | Age: 65
End: 2020-09-10
Payer: COMMERCIAL

## 2020-09-10 VITALS
BODY MASS INDEX: 27.84 KG/M2 | WEIGHT: 194 LBS | OXYGEN SATURATION: 98 % | HEART RATE: 61 BPM | DIASTOLIC BLOOD PRESSURE: 74 MMHG | TEMPERATURE: 97.9 F | SYSTOLIC BLOOD PRESSURE: 110 MMHG

## 2020-09-10 DIAGNOSIS — R10.13 EPIGASTRIC ABDOMINAL PAIN: ICD-10-CM

## 2020-09-10 LAB
A/G RATIO: 1.5 (ref 1.1–2.2)
ALBUMIN SERPL-MCNC: 4.3 G/DL (ref 3.4–5)
ALP BLD-CCNC: 75 U/L (ref 40–129)
ALT SERPL-CCNC: 27 U/L (ref 10–40)
ANION GAP SERPL CALCULATED.3IONS-SCNC: 12 MMOL/L (ref 3–16)
AST SERPL-CCNC: 23 U/L (ref 15–37)
BILIRUB SERPL-MCNC: 0.4 MG/DL (ref 0–1)
BUN BLDV-MCNC: 15 MG/DL (ref 7–20)
CALCIUM SERPL-MCNC: 9.7 MG/DL (ref 8.3–10.6)
CHLORIDE BLD-SCNC: 100 MMOL/L (ref 99–110)
CO2: 26 MMOL/L (ref 21–32)
CREAT SERPL-MCNC: 1 MG/DL (ref 0.8–1.3)
GFR AFRICAN AMERICAN: >60
GFR NON-AFRICAN AMERICAN: >60
GLOBULIN: 2.8 G/DL
GLUCOSE BLD-MCNC: 103 MG/DL (ref 70–99)
LIPASE: 31 U/L (ref 13–60)
POTASSIUM SERPL-SCNC: 3.9 MMOL/L (ref 3.5–5.1)
SODIUM BLD-SCNC: 138 MMOL/L (ref 136–145)
TOTAL PROTEIN: 7.1 G/DL (ref 6.4–8.2)
TROPONIN: <0.01 NG/ML

## 2020-09-10 PROCEDURE — 93000 ELECTROCARDIOGRAM COMPLETE: CPT | Performed by: FAMILY MEDICINE

## 2020-09-10 PROCEDURE — 99214 OFFICE O/P EST MOD 30 MIN: CPT | Performed by: FAMILY MEDICINE

## 2020-09-10 NOTE — PROGRESS NOTES
9/10/2020    This is a 59 y.o. male   Chief Complaint   Patient presents with    Back Pain     abd/stomach pain inbetween shoulders only had 1 day and went away. HPI   Patient is here for an episode of epigastric discomfort. He had a spell this week where for 4-hour period he reports severe epigastric abdominal pain that radiated to his back and shoulder blades. He was having a hard time getting any relief and so took a nap and by the time he woke up it was resolved. He had some nausea with this episode but no shortness of breath. He also reports some mild intermittent tingling and numbness in his right hand that is worse when he is using the computer mouse for work. This is been going on for months. Review of Systems   As per HPI, otherwise negative    Past Medical History:   Diagnosis Date    GERD (gastroesophageal reflux disease)     Hypercholesteremia     Hyperlipidemia LDL goal <130 6/16/2011    Hypertension     Tobacco abuse     Vitamin D deficiency        No past surgical history on file.     Family History   Problem Relation Age of Onset    Cancer Mother         skin    Heart Failure Father     Hypertension Father     Lung Cancer Brother 61    Heart Failure Brother     Heart Surgery Sister     Hypertension Sister     Heart Disease Sister         bicuspid aortic valve       Current Outpatient Medications   Medication Sig Dispense Refill    lisinopril-hydroCHLOROthiazide (PRINZIDE;ZESTORETIC) 20-25 MG per tablet Take 1 tablet by mouth daily (APPOINTMENT NEEDED FOR FURTHER REFILLS) 90 tablet 0    omeprazole (PRILOSEC) 20 MG delayed release capsule TAKE ONE CAPSULE BY MOUTH DAILY 90 capsule 0    vitamin D (CHOLECALCIFEROL) 250 MCG (99063 UT) CAPS capsule Take 1 capsule by mouth daily 90 capsule 3    fluticasone (FLONASE) 50 MCG/ACT nasal spray 2 sprays by Nasal route daily (Patient not taking: Reported on 9/10/2020) 1 Bottle 0    triamcinolone (KENALOG) 0.1 % cream Apply topically 2 times daily for 7 days (Patient not taking: Reported on 9/10/2020) 45 g 0    permethrin (ELIMITE) 5 % cream Apply topically once. Wash in 12 hours. Repeat in one week. (Patient not taking: Reported on 5/15/2020) 1 Tube 1    varenicline (CHANTIX STARTING MONTH PAK) 0.5 MG X 11 & 1 MG X 42 tablet Take by mouth. (Patient not taking: Reported on 5/15/2020) 1 box 0    pantoprazole (PROTONIX) 40 MG tablet Take 1 tablet by mouth daily (Patient not taking: Reported on 5/15/2020) 30 tablet 3     No current facility-administered medications for this visit. /74   Pulse 61   Temp 97.9 °F (36.6 °C)   Wt 194 lb (88 kg)   SpO2 98%   BMI 27.84 kg/m²     Physical Exam  Constitutional:       Appearance: He is well-developed. HENT:      Head: Normocephalic and atraumatic. Neck:      Musculoskeletal: Normal range of motion. Cardiovascular:      Rate and Rhythm: Normal rate and regular rhythm. Heart sounds: Normal heart sounds. Pulmonary:      Effort: Pulmonary effort is normal.      Breath sounds: Normal breath sounds. Abdominal:      General: Abdomen is flat. There is no distension. Tenderness: There is no abdominal tenderness. There is no guarding or rebound. Musculoskeletal: Normal range of motion. General: No tenderness. Skin:     General: Skin is warm and dry. Findings: No rash. Neurological:      Mental Status: He is alert and oriented to person, place, and time. Deep Tendon Reflexes: Reflexes are normal and symmetric. Wt Readings from Last 3 Encounters:   09/10/20 194 lb (88 kg)   05/15/20 183 lb (83 kg)   11/15/19 186 lb 5.8 oz (84.5 kg)       BP Readings from Last 3 Encounters:   09/10/20 110/74   05/15/20 105/62   11/15/19 116/74         Assessment/Plan:  1. Epigastric abdominal pain  His episode is concerning for an atypical cardiac presentation. He does have some risk factors including tobacco use.   His EKG does not concerning findings and

## 2020-10-08 ENCOUNTER — NURSE TRIAGE (OUTPATIENT)
Dept: OTHER | Facility: CLINIC | Age: 65
End: 2020-10-08

## 2020-10-08 ENCOUNTER — OFFICE VISIT (OUTPATIENT)
Dept: FAMILY MEDICINE CLINIC | Age: 65
End: 2020-10-08
Payer: COMMERCIAL

## 2020-10-08 VITALS
DIASTOLIC BLOOD PRESSURE: 70 MMHG | TEMPERATURE: 97.9 F | WEIGHT: 192.6 LBS | RESPIRATION RATE: 14 BRPM | BODY MASS INDEX: 27.57 KG/M2 | HEART RATE: 72 BPM | SYSTOLIC BLOOD PRESSURE: 114 MMHG | HEIGHT: 70 IN | OXYGEN SATURATION: 94 %

## 2020-10-08 PROCEDURE — 99213 OFFICE O/P EST LOW 20 MIN: CPT | Performed by: NURSE PRACTITIONER

## 2020-10-08 RX ORDER — ACYCLOVIR 400 MG/1
400 TABLET ORAL 4 TIMES DAILY
Qty: 28 TABLET | Refills: 0 | Status: SHIPPED | OUTPATIENT
Start: 2020-10-08 | End: 2020-10-15

## 2020-10-08 NOTE — PATIENT INSTRUCTIONS
your health, and be sure to contact your doctor if:    · You do not get better as expected. Where can you learn more? Go to [email or web address] and sign in to your [employer] account. Enter M057 in the lifeIO box to learn more about \"Burak Becerra: Care Instructions. \"     If you do not have an account, please click on the \"Sign Up Now\" link. Current as of: February 11, 2020               Content Version: 12.6  © 5999-6892 [employer]. Care instructions adapted under license by [facility] ([facility]). If you have questions about a medical condition or this instruction, always ask your healthcare professional. [address] any warranty or liability for your use of this information.

## 2020-10-08 NOTE — PROGRESS NOTES
10/8/2020    This is a 59 y.o. male   Chief Complaint   Patient presents with    Rash     x2 weeks. blisters on fingers. both hands. itching   . HPI  Patient reports that for the past 2-3 weeks has developed a blister rash between the webs of fingers. There is discomfort when rubbing fingers together. Denies drainage. Has tried OTC hydrocortisone cream without improvement in symptoms. Reports that he is still getting new blisters. Denies recent cold sore. Patient Active Problem List   Diagnosis    Vitamin D deficiency    Hyperlipidemia LDL goal <130    GERD (gastroesophageal reflux disease)    Essential hypertension    Tobacco abuse    Insomnia    Snoring    ED (erectile dysfunction)    Low HDL (under 40)          Current Outpatient Medications   Medication Sig Dispense Refill    lisinopril-hydroCHLOROthiazide (PRINZIDE;ZESTORETIC) 20-25 MG per tablet Take 1 tablet by mouth daily (APPOINTMENT NEEDED FOR FURTHER REFILLS) 90 tablet 0    omeprazole (PRILOSEC) 20 MG delayed release capsule TAKE ONE CAPSULE BY MOUTH DAILY 90 capsule 0    fluticasone (FLONASE) 50 MCG/ACT nasal spray 2 sprays by Nasal route daily 1 Bottle 0    vitamin D (CHOLECALCIFEROL) 250 MCG (01986 UT) CAPS capsule Take 1 capsule by mouth daily 90 capsule 3    pantoprazole (PROTONIX) 40 MG tablet Take 1 tablet by mouth daily 30 tablet 3     No current facility-administered medications for this visit. Allergies   Allergen Reactions    Atorvastatin Other (See Comments)     Loss appetite    Chantix [Varenicline Tartrate] Nausea And Vomiting       Review of Systems   Constitutional: Negative for activity change and fever. Respiratory: Negative for cough and shortness of breath. Cardiovascular: Negative for chest pain. Skin: Positive for rash. Neurological: Negative for numbness.        Vitals:    10/08/20 1454   BP: 114/70   Site: Right Upper Arm   Position: Sitting   Cuff Size: Medium Adult   Pulse: 72   Resp: 14   Temp: 97.9 °F (36.6 °C)   TempSrc: Temporal   SpO2: 94%   Weight: 192 lb 9.6 oz (87.4 kg)   Height: 5' 10\" (1.778 m)       Body mass index is 27.64 kg/m². Wt Readings from Last 3 Encounters:   10/08/20 192 lb 9.6 oz (87.4 kg)   09/10/20 194 lb (88 kg)   05/15/20 183 lb (83 kg)       BP Readings from Last 3 Encounters:   10/08/20 114/70   09/10/20 110/74   05/15/20 105/62       Physical Exam  Vitals signs and nursing note reviewed. Constitutional:       Appearance: He is well-developed. HENT:      Head: Normocephalic and atraumatic. Eyes:      Extraocular Movements: Extraocular movements intact. Conjunctiva/sclera: Conjunctivae normal.   Pulmonary:      Effort: Pulmonary effort is normal.   Skin:     General: Skin is warm and dry. Comments: Between webs of finger is small vesicular rash. Most areas have scabbed over, but does have a few new blisters. Neurological:      Mental Status: He is alert and oriented to person, place, and time. Psychiatric:         Behavior: Behavior normal.         Thought Content: Thought content normal.         Judgment: Judgment normal.         Assessmentand Plan  Lamond Shone was seen today for rash. Diagnoses and all orders for this visit:    Herpetic jose juan  -     acyclovir (ZOVIRAX) 400 MG tablet; Take 1 tablet by mouth 4 times daily for 7 days  Discussed that rash should resolve within the next couple of weeks. Patient is to monitor for a secondary infection. He will let me know if symptoms worsen or fail to resolve as anticipated. Return if symptoms worsen or fail to improve.

## 2020-10-08 NOTE — TELEPHONE ENCOUNTER
Received call from [facility]. Reason for Disposition   [1] Cause unknown AND [2] new blisters are developing    Answer Assessment - Initial Assessment Questions  1. APPEARANCE of BLISTER: \"What does it look like? \"      Pt reports clear blisters, no open areas, spreading    2. SIZE: \"How large is the blister? \" (inches, cm or compare to coins)      Small blisters, tip of a pencil in size, too many to count    3. LOCATION: \"Where are the blisters located? \"       Both hands, webbing in between fingers    4. WHEN: \"When did the blister happen? \"      Developed 2 weeks ago    5. CAUSE: \"What do you think caused the blister? \"      Pt is not sure    6. PAIN: \"Does it hurt? \" If so, ask: \"How bad is the pain? \"  (Scale 1-10; or mild, moderate, severe)      Denies     7. OTHER SYMPTOMS: \"Do you have any other symptoms? \" (e.g., fever)      Itchiness    Protocols used: BLISTER - FOOT AND HAND-ADULT-    Recommended that pt be seen within the next 24 hours. Provided pt with care advice. Call soft transferred to [place] [id number] to schedule appointment. Attention Provider: Thank you for allowing me to participate in the care of your patient. The  patient was connected to triage in response to information provided to the [employer]. Please do not respond through this encounter as the response is not directed to a shared pool.

## 2020-10-11 ASSESSMENT — ENCOUNTER SYMPTOMS
COUGH: 0
SHORTNESS OF BREATH: 0

## 2020-10-12 RX ORDER — ACYCLOVIR 400 MG/1
TABLET ORAL
Qty: 28 TABLET | Refills: 0 | OUTPATIENT
Start: 2020-10-12

## 2020-10-26 RX ORDER — OMEPRAZOLE 20 MG/1
CAPSULE, DELAYED RELEASE ORAL
Qty: 90 CAPSULE | Refills: 0 | Status: SHIPPED | OUTPATIENT
Start: 2020-10-26 | End: 2021-01-21

## 2020-10-27 RX ORDER — OMEPRAZOLE 20 MG/1
CAPSULE, DELAYED RELEASE ORAL
Qty: 90 CAPSULE | Refills: 0 | OUTPATIENT
Start: 2020-10-27

## 2020-10-27 RX ORDER — LISINOPRIL AND HYDROCHLOROTHIAZIDE 25; 20 MG/1; MG/1
TABLET ORAL
Qty: 90 TABLET | Refills: 0 | Status: SHIPPED | OUTPATIENT
Start: 2020-10-27 | End: 2021-02-01

## 2021-01-21 RX ORDER — OMEPRAZOLE 20 MG/1
CAPSULE, DELAYED RELEASE ORAL
Qty: 90 CAPSULE | Refills: 0 | Status: SHIPPED | OUTPATIENT
Start: 2021-01-21 | End: 2021-04-30

## 2021-01-31 DIAGNOSIS — I10 ESSENTIAL HYPERTENSION: ICD-10-CM

## 2021-02-01 RX ORDER — OMEPRAZOLE 20 MG/1
CAPSULE, DELAYED RELEASE ORAL
Qty: 90 CAPSULE | Refills: 0 | OUTPATIENT
Start: 2021-02-01

## 2021-02-01 RX ORDER — LISINOPRIL AND HYDROCHLOROTHIAZIDE 25; 20 MG/1; MG/1
TABLET ORAL
Qty: 90 TABLET | Refills: 0 | Status: SHIPPED | OUTPATIENT
Start: 2021-02-01 | End: 2021-05-10

## 2021-04-30 RX ORDER — OMEPRAZOLE 20 MG/1
CAPSULE, DELAYED RELEASE ORAL
Qty: 90 CAPSULE | Refills: 0 | Status: SHIPPED | OUTPATIENT
Start: 2021-04-30 | End: 2021-08-06

## 2021-05-10 DIAGNOSIS — I10 ESSENTIAL HYPERTENSION: ICD-10-CM

## 2021-05-10 RX ORDER — OMEPRAZOLE 20 MG/1
CAPSULE, DELAYED RELEASE ORAL
Qty: 90 CAPSULE | Refills: 0 | OUTPATIENT
Start: 2021-05-10

## 2021-05-10 RX ORDER — LISINOPRIL AND HYDROCHLOROTHIAZIDE 25; 20 MG/1; MG/1
TABLET ORAL
Qty: 90 TABLET | Refills: 0 | Status: SHIPPED | OUTPATIENT
Start: 2021-05-10 | End: 2021-08-09

## 2021-08-06 RX ORDER — OMEPRAZOLE 20 MG/1
CAPSULE, DELAYED RELEASE ORAL
Qty: 90 CAPSULE | Refills: 0 | Status: SHIPPED | OUTPATIENT
Start: 2021-08-06 | End: 2021-10-29

## 2021-08-08 DIAGNOSIS — I10 ESSENTIAL HYPERTENSION: ICD-10-CM

## 2021-08-09 RX ORDER — LISINOPRIL AND HYDROCHLOROTHIAZIDE 25; 20 MG/1; MG/1
TABLET ORAL
Qty: 90 TABLET | Refills: 0 | Status: SHIPPED | OUTPATIENT
Start: 2021-08-09 | End: 2021-10-29

## 2021-08-09 RX ORDER — OMEPRAZOLE 20 MG/1
CAPSULE, DELAYED RELEASE ORAL
Qty: 90 CAPSULE | Refills: 0 | OUTPATIENT
Start: 2021-08-09

## 2021-10-08 ENCOUNTER — OFFICE VISIT (OUTPATIENT)
Dept: FAMILY MEDICINE CLINIC | Age: 66
End: 2021-10-08
Payer: MEDICARE

## 2021-10-08 VITALS
OXYGEN SATURATION: 96 % | HEART RATE: 58 BPM | HEIGHT: 69 IN | DIASTOLIC BLOOD PRESSURE: 73 MMHG | TEMPERATURE: 97.8 F | SYSTOLIC BLOOD PRESSURE: 114 MMHG | BODY MASS INDEX: 27.99 KG/M2 | WEIGHT: 189 LBS

## 2021-10-08 DIAGNOSIS — L30.9 ECZEMA OF BOTH HANDS: ICD-10-CM

## 2021-10-08 DIAGNOSIS — Z00.00 ROUTINE GENERAL MEDICAL EXAMINATION AT A HEALTH CARE FACILITY: Primary | ICD-10-CM

## 2021-10-08 DIAGNOSIS — E78.5 HYPERLIPIDEMIA LDL GOAL <130: ICD-10-CM

## 2021-10-08 DIAGNOSIS — K21.9 GASTROESOPHAGEAL REFLUX DISEASE, UNSPECIFIED WHETHER ESOPHAGITIS PRESENT: ICD-10-CM

## 2021-10-08 DIAGNOSIS — I10 ESSENTIAL HYPERTENSION: ICD-10-CM

## 2021-10-08 DIAGNOSIS — E55.9 VITAMIN D DEFICIENCY: ICD-10-CM

## 2021-10-08 PROCEDURE — 3017F COLORECTAL CA SCREEN DOC REV: CPT | Performed by: FAMILY MEDICINE

## 2021-10-08 PROCEDURE — G8417 CALC BMI ABV UP PARAM F/U: HCPCS | Performed by: FAMILY MEDICINE

## 2021-10-08 PROCEDURE — 99213 OFFICE O/P EST LOW 20 MIN: CPT | Performed by: FAMILY MEDICINE

## 2021-10-08 PROCEDURE — G8427 DOCREV CUR MEDS BY ELIG CLIN: HCPCS | Performed by: FAMILY MEDICINE

## 2021-10-08 PROCEDURE — G0402 INITIAL PREVENTIVE EXAM: HCPCS | Performed by: FAMILY MEDICINE

## 2021-10-08 PROCEDURE — 4040F PNEUMOC VAC/ADMIN/RCVD: CPT | Performed by: FAMILY MEDICINE

## 2021-10-08 PROCEDURE — 1036F TOBACCO NON-USER: CPT | Performed by: FAMILY MEDICINE

## 2021-10-08 PROCEDURE — G8484 FLU IMMUNIZE NO ADMIN: HCPCS | Performed by: FAMILY MEDICINE

## 2021-10-08 PROCEDURE — 1123F ACP DISCUSS/DSCN MKR DOCD: CPT | Performed by: FAMILY MEDICINE

## 2021-10-08 RX ORDER — CLOBETASOL PROPIONATE 0.5 MG/G
CREAM TOPICAL
Qty: 60 G | Refills: 3 | Status: SHIPPED | OUTPATIENT
Start: 2021-10-08 | End: 2022-06-20

## 2021-10-08 NOTE — PROGRESS NOTES
Medicare Annual Wellness Visit  Name: Ry Orellana  YOB: 1955  Age: 72 y.o. Sex: male  MRN: <[id number]>     Date of Service:  10/8/2021    Chief Complaint:   Ry Orellana is a 72 y.o. male who presents for Medicare Annual Wellness Visit and check-up for:  1. Routine general medical examination at a health care facility    2. Essential hypertension    3. Hyperlipidemia LDL goal <130    4. Vitamin D deficiency    5. Gastroesophageal reflux disease, unspecified whether esophagitis present    6. Eczema of both hands      HPI    Chief Complaint   Patient presents with    Annual Exam   Complaints: gets episodic itch and blisters in finger webs. Saw derm and Tx bactroban and clobetasol. Review of Systems - unremarable except as noted above    HISTORY:  Patient's medications, allergies, past medical, and social histories were reviewed and updated as appropriate.      CHART REVIEW  Health Maintenance   Topic Date Due    Diabetes screen  Never done    Low dose CT lung screening  Never done    Shingles Vaccine (2 of 3) 09/10/2015    Pneumococcal 65+ years Vaccine (1 of 1 - PPSV23) 01/06/2021    Flu vaccine (1) 09/01/2021    Potassium monitoring  09/10/2021    Creatinine monitoring  09/10/2021    Lipid screen  04/24/2024    DTaP/Tdap/Td vaccine (4 - Td or Tdap) 07/16/2025    Colon cancer screen colonoscopy  11/12/2028    COVID-19 Vaccine  Completed    AAA screen  Completed    Hepatitis C screen  Completed    HIV screen  Completed    Hepatitis A vaccine  Aged Out    Hepatitis B vaccine  Aged Out    Hib vaccine  Aged Out    Meningococcal (ACWY) vaccine  Aged Out     The 10-year ASCVD risk score (Dominguez Sherman et al., 2013) is: 13.8%    Values used to calculate the score:      Age: 72 years      Sex: Male      Is Non- : No      Diabetic: No      Tobacco smoker: No      Systolic Blood Pressure: 638 mmHg      Is BP treated: Yes      HDL Cholesterol: 27 mg/dL Total Cholesterol: 145 mg/dL  Current Outpatient Medications   Medication Instructions    clobetasol (TEMOVATE) 0.05 % cream Apply topically 2 times daily.  fluticasone (FLONASE) 50 MCG/ACT nasal spray 2 sprays, Nasal, DAILY    lisinopril-hydroCHLOROthiazide (PRINZIDE;ZESTORETIC) 20-25 MG per tablet TAKE ONE TABLET BY MOUTH DAILY    omeprazole (PRILOSEC) 20 MG delayed release capsule TAKE ONE CAPSULE BY MOUTH DAILY    vitamin D (CHOLECALCIFEROL) 10,000 Units, Oral, DAILY      Family History   Problem Relation Age of Onset    Cancer Mother         skin    Heart Failure Father     Hypertension Father     Lung Cancer Brother 61    Heart Failure Brother     Heart Surgery Sister     Hypertension Sister     Heart Disease Sister         bicuspid aortic valve     Social History     Tobacco Use    Smoking status: Former Smoker     Packs/day: 0.50     Years: 40.00     Pack years: 20.00     Types: Cigarettes     Start date: 1964     Quit date: 2020     Years since quittin.2    Smokeless tobacco: Never Used    Tobacco comment: quit about 14 weeks ago   Substance Use Topics    Alcohol use:  Yes     Alcohol/week: 0.0 standard drinks     Comment: occassional up to 5 at a time    Drug use: No      Immunization History   Administered Date(s) Administered    COVID-19, Moderna, PF, 100mcg/0.5mL 2021, 2021    Influenza Virus Vaccine 10/01/2008, 10/29/2020    Influenza, Intradermal, Preservative free 2013, 2013, 2016    Influenza, Intradermal, Quadrivalent, Preservative Free 2016, 2017    Influenza, Quadv, IM, PF (6 mo and older Fluzone, Flulaval, Fluarix, and 3 yrs and older Afluria) 11/15/2019    Pneumococcal Polysaccharide (Eionqhehq01) 2016    Tdap (Boostrix, Adacel) 2004, 2008, 2015    Zoster Live (Zostavax) 2015     LAST LABS  Cholesterol, Total   Date Value Ref Range Status   2019 145 0 - 199 mg/dL Final     LDL Calculated   Date Value Ref Range Status   04/24/2019 100 (H) <100 mg/dL Final     HDL   Date Value Ref Range Status   04/24/2019 27 (L) 40 - 60 mg/dL Final   12/23/2011 31 (L) 40 - 60 mg/dl Final     Triglycerides   Date Value Ref Range Status   04/24/2019 88 0 - 150 mg/dL Final     Lab Results   Component Value Date    GLUCOSE 103 (H) 09/10/2020     Lab Results   Component Value Date     09/10/2020    K 3.9 09/10/2020    CREATININE 1.0 09/10/2020     Lab Results   Component Value Date    WBC 7.1 04/24/2019    HGB 15.2 04/24/2019    HCT 44.6 04/24/2019    MCV 91.8 04/24/2019     04/24/2019     Lab Results   Component Value Date    ALT 27 09/10/2020    AST 23 09/10/2020    ALKPHOS 75 09/10/2020    BILITOT 0.4 09/10/2020     TSH (uIU/mL)   Date Value   01/06/2016 1.35     No results found for: LABA1C     CareTeam (Including outside providers/suppliers regularly involved in providing care):   Patient Care Team:  Lucía Cole MD as PCP - General (Family Medicine)  Lucía Cole MD as PCP - [facility] Empaneled Provider    The following problems were reviewed today and where indicated follow up appointments were made and/or referrals ordered. Positive Risk Factor Screenings with Interventions:        General Health and ACP:     Advance Directives     Power of  Living Will ACP-Advance Directive ACP-Power of     Not on File [place] on 05/22/17 Filed Not on File      General Health Risk Interventions:  · No Living Will: ACP documents already completed- patient asked to provide copy to the office    Current Health Maintenance Status  Recommendations for Preventive Services Due: see orders.   Recommended screening schedule for the next 5-10 years is provided to the patient in written form: see Patient Instructions/AVS.    PHYSICAL EXAM:  VITALS:  /73 (Site: Right Upper Arm, Position: Sitting, Cuff Size: Medium Adult)   Pulse 58   Temp 97.8 °F (36.6 °C)   Ht 5' 9\" (1.753 m)   Wt 189 lb (85.7 kg)   SpO2 96%   BMI 27.91 kg/m²   BP Readings from Last 5 Encounters:   10/08/21 114/73   10/08/20 114/70   09/10/20 110/74   05/15/20 105/62   11/15/19 116/74     Wt Readings from Last 5 Encounters:   10/08/21 189 lb (85.7 kg)   10/08/20 192 lb 9.6 oz (87.4 kg)   09/10/20 194 lb (88 kg)   05/15/20 183 lb (83 kg)   11/15/19 186 lb 5.8 oz (84.5 kg)   Body mass index is 27.91 kg/m². GENERAL: well-developed, well-nourished, alert, no distress, calm   EYES: negative findings: lids and lashes normal and conjunctivae and sclerae normal  ENT: normal TM's and external ear canals both ears  · External nose and ears appear normal  · Pharynx: normal. Exudates: None  · Lips, mucosa, and tongue normal  · Hearing grossly normal.    NECK: No adenopathy, supple, symmetrical, trachea midline  · Thyroid not enlarged, symmetric, no tenderness/mass/nodules  · no cervical nodes, no supraclavicular nodes  LUNGS:  Breathing unlabored  · clear to auscultation bilaterally and good air movement  CARDIOVASC: regular rate and rhythm, S1, S2 normal   LEGS:  Lower extremity edema: none     No carotid bruits  ABDOMEN: Soft, non-tender, no masses  · No hepatosplenomegaly  · No hernias noted. Exam limited by N/A  SKIN: warm and dry  · No rashes or suspicious lesions  PSYCH:  Alert and oriented  · Normal reasoning, insight good  · Facial expressions full, mood appropriate  · No memory disturbance noted  MUSCULOSKEL:  No significant finger or nail findings  · Spine symmetric, no deformities, no kyphosis   GAIT: UP and Go test: <30 seconds with gait: normal.  Speed Normal.  No significant balance checks. No extra steps on turn around. Assistive device: none        Assessment and Plan:      Diagnosis Orders   1. Routine general medical examination at a health care facility     2. Essential hypertension  Comprehensive Metabolic Panel   3. Hyperlipidemia LDL goal <130  Comprehensive Metabolic Panel    Lipid Panel   4.  Vitamin D deficiency Vitamin D 25 Hydroxy   5. Gastroesophageal reflux disease, unspecified whether esophagitis present     6. Eczema of both hands  clobetasol (TEMOVATE) 0.05 % cream   Stable. Plan as above and below. Good control. Current treatment plan is effective, continue same. FYI: While Medicare provides you with a FREE ANNUAL PREVENTIVE PHYSICAL, this visit does NOT include management of chronic medical problems or physical examination. Dr. Geoffrey Palacios usually does a combination visit if you have other medical problems so you don't have to come back for another visit. However, this means that there will be a co-pay. INSTRUCTIONS  NEXT APPOINTMENT: Please schedule check-up in 6 months. · PLEASE TAKE THIS FORM TO CHECK-OUT WINDOW TO SCHEDULE NEXT VISIT. · PLEASE GET BLOODWORK DRAWN TODAY ON FIRST FLOOR in 170. Take orders with you. RESULTS- most blood tests back in couple days. We will call you if any problems. If bloodwork good, you will get letter in mail or notified thru [address] (if signed up) within 2 weeks. If you do not, please call office. · Please get flu vaccine and pneumovax at pharmacy. · Medicare part D patients:  Get Shingrix shingles vaccine at pharmacy (such as [facility] or [facility]). Need second dose in 2-6 months. · Please bring in copy of Living Will and Medical Power of Rajesh Zapata for our records.

## 2021-10-08 NOTE — PATIENT INSTRUCTIONS
FYI: While Medicare provides you with a FREE ANNUAL PREVENTIVE PHYSICAL, this visit does NOT include management of chronic medical problems or physical examination. Dr. Dorothea Orellana usually does a combination visit if you have other medical problems so you don't have to come back for another visit. However, this means that there will be a co-pay. INSTRUCTIONS  NEXT APPOINTMENT: Please schedule check-up in 6 months. · PLEASE TAKE THIS FORM TO CHECK-OUT WINDOW TO SCHEDULE NEXT VISIT. · PLEASE GET BLOODWORK DRAWN TODAY ON FIRST FLOOR in 170. Take orders with you. RESULTS- most blood tests back in couple days. We will call you if any problems. If bloodwork good, you will get letter in mail or notified thru [address] (if signed up) within 2 weeks. If you do not, please call office. · Please get flu vaccine and pneumovax at pharmacy. · Medicare part D patients:  Get Shingrix shingles vaccine at pharmacy (such as [facility] or [facility]). Need second dose in 2-6 months. · Please bring in copy of Living Will and Medical Power of Rajesh Zapata for our records. Patient Education   ADVANCE DIRECTIVES AND DO NOT RESUSCITATE ORDERS     What is an advance directive? An advance directive tells your doctor what kind of care you would like to have if you become unable to make medical decisions (if you are in a coma, for example). If you are admitted to the hospital, the hospital staff will probably talk to you about advance directives. A good advance directive describes the kind of treatment you would want depending on how sick you are. For example, the directives would describe what kind of care you want if you have an illness that you are unlikely to recover from, or if you are permanently unconscious. Advance directives usually tell your doctor that you don't want certain kinds of treatment. However, they can also say that you want a certain treatment no matter how ill you are. Advance directives can take many forms.  Laws illness. This will spare your loved ones the stress of making decisions about your care while you are sick. Any person 25years of age or older can prepare an advance directive. People who are seriously or terminally ill are more likely to have an advance directive. For example, someone who has terminal cancer might write that she does not want to be put on a respirator if she stops breathing. This action can reduce her suffering, increase her peace of mind and increase her control over her death. However, even if you are in good health, you might want to consider writing an advance directive. An accident or serious illness can happen suddenly, and if you already have a signed advance directive, your wishes are more likely to be followed. How can I write an advance directive? You can write an advance directive in several ways:  Use a form provided by your doctor. Write your wishes down by yourself. Call your health department or state department on aging to get a form. Call a . Use a computer [facility] for legal documents. Advance directives and living peralta do not have to be complicated legal documents. They can be short, simple statements about what you want done or not done if you can't speak for yourself. Remember, anything you write by yourself or with a computer software package should follow your state laws. You may also want to have what you have written reviewed by your doctor or a  to make sure your directives are understood exactly as you intended. When you are satisfied with your directives, the orders should be notarized if possible, and copies should be given to your family and your doctor. Can I change my advance directive? You may change or cancel your advance directive at any time, as long as you are considered of sound mind to do so. Being of sound mind means that you are still able to think rationally and communicate your wishes in a clear manner.  Again, your changesmust be made, signed and notarized according to the laws in your state. Make sure that your doctor and any family members who knew about your directives are also aware that you have changed them. If you do not have time to put your changes in writing, you can make them known while you are in the hospital. Tell your doctor and any family or friends present exactly what you want to happen. Usually, wishes that are made in person will be followed in place of the ones made earlier in writing. Be sure your instructions are clearly understood by everyone you have told. Personalized Preventive Plan for Alberto Owen - 10/8/2021  Medicare offers a range of preventive health benefits. Some of the tests and screenings are paid in full while other may be subject to a deductible, co-insurance, and/or copay. Some of these benefits include a comprehensive review of your medical history including lifestyle, illnesses that may run in your family, and various assessments and screenings as appropriate. After reviewing your medical record and screening and assessments performed today your provider may have ordered immunizations, labs, imaging, and/or referrals for you. A list of these orders (if applicable) as well as your Preventive Care list are included within your After Visit Summary for your review. Other Preventive Recommendations:    · A preventive eye exam performed by an eye specialist is recommended every 1-2 years to screen for glaucoma; cataracts, macular degeneration, and other eye disorders. · A preventive dental visit is recommended every 6 months. · Try to get at least 150 minutes of exercise per week or 10,000 steps per day on a pedometer . · Order or download the FREE \"Exercise & Physical Activity: Your Everyday Guide\" from [employer]. Call 6-968.904.9215 or search [employer] online.   · You need 6328-3068 mg of calcium and 4256-5192 IU of vitamin D per day. It is possible to meet your calcium requirement with diet alone, but a vitamin D supplement is usually necessary to meet this goal.  · When exposed to the sun, use a sunscreen that protects against both UVA and UVB radiation with an SPF of 30 or greater. Reapply every 2 to 3 hours or after sweating, drying off with a towel, or swimming. · Always wear a seat belt when traveling in a car. Always wear a helmet when riding a bicycle or motorcycle.

## 2021-10-29 DIAGNOSIS — I10 ESSENTIAL HYPERTENSION: ICD-10-CM

## 2021-10-29 RX ORDER — OMEPRAZOLE 20 MG/1
CAPSULE, DELAYED RELEASE ORAL
Qty: 90 CAPSULE | Refills: 0 | Status: SHIPPED | OUTPATIENT
Start: 2021-10-29 | End: 2022-02-08 | Stop reason: SDUPTHER

## 2021-10-29 RX ORDER — LISINOPRIL AND HYDROCHLOROTHIAZIDE 25; 20 MG/1; MG/1
TABLET ORAL
Qty: 90 TABLET | Refills: 0 | Status: SHIPPED | OUTPATIENT
Start: 2021-10-29 | End: 2022-02-08 | Stop reason: SDUPTHER

## 2022-04-21 ENCOUNTER — HOSPITAL ENCOUNTER (OUTPATIENT)
Dept: GENERAL RADIOLOGY | Age: 67
Discharge: HOME | End: 2022-04-21
Payer: MEDICARE

## 2022-04-21 ENCOUNTER — OFFICE VISIT (OUTPATIENT)
Dept: FAMILY MEDICINE CLINIC | Age: 67
End: 2022-04-21
Payer: MEDICARE

## 2022-04-21 ENCOUNTER — HOSPITAL ENCOUNTER (OUTPATIENT)
Age: 67
Discharge: HOME | End: 2022-04-21
Payer: MEDICARE

## 2022-04-21 VITALS
WEIGHT: 188 LBS | DIASTOLIC BLOOD PRESSURE: 70 MMHG | HEART RATE: 66 BPM | HEIGHT: 69 IN | OXYGEN SATURATION: 96 % | RESPIRATION RATE: 18 BRPM | BODY MASS INDEX: 27.85 KG/M2 | SYSTOLIC BLOOD PRESSURE: 108 MMHG

## 2022-04-21 DIAGNOSIS — K21.9 GASTROESOPHAGEAL REFLUX DISEASE, UNSPECIFIED WHETHER ESOPHAGITIS PRESENT: ICD-10-CM

## 2022-04-21 DIAGNOSIS — R06.02 SHORTNESS OF BREATH: ICD-10-CM

## 2022-04-21 DIAGNOSIS — R05.9 COUGH IN ADULT: ICD-10-CM

## 2022-04-21 DIAGNOSIS — R09.82 PND (POST-NASAL DRIP): ICD-10-CM

## 2022-04-21 DIAGNOSIS — R14.0 ABDOMINAL BLOATING: ICD-10-CM

## 2022-04-21 DIAGNOSIS — R06.02 SHORTNESS OF BREATH: Primary | ICD-10-CM

## 2022-04-21 DIAGNOSIS — R06.2 WHEEZING: ICD-10-CM

## 2022-04-21 PROCEDURE — 71046 X-RAY EXAM CHEST 2 VIEWS: CPT

## 2022-04-21 PROCEDURE — 99214 OFFICE O/P EST MOD 30 MIN: CPT

## 2022-04-21 RX ORDER — ALBUTEROL SULFATE 90 UG/1
2 AEROSOL, METERED RESPIRATORY (INHALATION) 4 TIMES DAILY PRN
Qty: 18 G | Refills: 5 | Status: SHIPPED | OUTPATIENT
Start: 2022-04-21

## 2022-04-21 RX ORDER — FLUTICASONE PROPIONATE 50 MCG
2 SPRAY, SUSPENSION (ML) NASAL DAILY
Qty: 1 EACH | Refills: 1 | Status: SHIPPED | OUTPATIENT
Start: 2022-04-21

## 2022-04-21 ASSESSMENT — PATIENT HEALTH QUESTIONNAIRE - PHQ9
SUM OF ALL RESPONSES TO PHQ QUESTIONS 1-9: 0
SUM OF ALL RESPONSES TO PHQ QUESTIONS 1-9: 0
SUM OF ALL RESPONSES TO PHQ9 QUESTIONS 1 & 2: 0
SUM OF ALL RESPONSES TO PHQ QUESTIONS 1-9: 0
2. FEELING DOWN, DEPRESSED OR HOPELESS: 0
SUM OF ALL RESPONSES TO PHQ QUESTIONS 1-9: 0
1. LITTLE INTEREST OR PLEASURE IN DOING THINGS: 0

## 2022-04-21 ASSESSMENT — ENCOUNTER SYMPTOMS
CHEST TIGHTNESS: 0
VOMITING: 0
SHORTNESS OF BREATH: 1
ABDOMINAL PAIN: 0
RHINORRHEA: 1
EYE ITCHING: 0
NAUSEA: 0
SINUS PRESSURE: 1
FACIAL SWELLING: 0
COLOR CHANGE: 0
ABDOMINAL DISTENTION: 1
SORE THROAT: 0
COUGH: 1
DIARRHEA: 0
SINUS PAIN: 0
WHEEZING: 1
EYE DISCHARGE: 0
CONSTIPATION: 0
EYE REDNESS: 0

## 2022-04-21 NOTE — PROGRESS NOTES
4/21/2022    This is a 77 y.o. male   Chief Complaint   Patient presents with    Shortness of Breath     Pt said that he is having some period of the day when he feels like he isnt getting enough air. Pt said that it comes and goes. Pt said that he does have some sinus stuff going on and has a dry non productive cough. Pt denied chest pains. Pt said this started about 3-4 days ago.  Bloated     Pt said that he feels really full after eating even if he didnt eat that much to begin with. Daniel Gallegos HPI  Patient presents to clinic to day with complaints of shortness of breath, mostly in the early morning and late in evening. He feels like he can't catch his breath, and he has been wheezing when he lays down. He reports that he tried his wife's albuterol inhaler this morning which helped. Checking O2 at home- 89-90 when first waking, but comes up with some deep breathing. Has a dry cough producing thick, white sputum. Positive for sinus pressure, PNDand slight headache. He denies fever, sore throat and chest pain. He denies leg swelling. He has taken nothing for his symptoms. He denies symptoms when active and walking at work. He reports that he cleaned up a lot of leaves in his yard recently and feels this might have aggravated the symptoms. Patient also c/o increased bloating. He reports that he often eats mcdonalds breakfast and a burger for lunch. He is currently taking Prilosec for GERD. No abdominal pain, constipation, diarrhea, nausea/vomiting. Avoids lying down for hours after eating. Recently he has been eating a large meal at lunch and then no dinner. Has had increased belching and flatulence.      Patient Active Problem List   Diagnosis    Vitamin D deficiency    Hyperlipidemia LDL goal <130    GERD (gastroesophageal reflux disease)    Essential hypertension    Insomnia    Snoring    ED (erectile dysfunction)    Low HDL (under 40)          Current Outpatient Medications Medication Sig Dispense Refill    albuterol sulfate HFA (VENTOLIN HFA) 108 (90 Base) MCG/ACT inhaler Inhale 2 puffs into the lungs 4 times daily as needed for Wheezing 18 g 5    fluticasone (FLONASE) 50 MCG/ACT nasal spray 2 sprays by Nasal route daily 1 each 1    omeprazole (PRILOSEC) 20 MG delayed release capsule TAKE ONE CAPSULE BY MOUTH DAILY 90 capsule 0    lisinopril-hydroCHLOROthiazide (PRINZIDE;ZESTORETIC) 20-25 MG per tablet TAKE ONE TABLET BY MOUTH DAILY 90 tablet 0    clobetasol (TEMOVATE) 0.05 % cream Apply topically 2 times daily. 60 g 3    vitamin D (CHOLECALCIFEROL) 250 MCG (09711 UT) CAPS capsule Take 1 capsule by mouth daily 90 capsule 3     No current facility-administered medications for this visit. Allergies   Allergen Reactions    Atorvastatin Other (See Comments)     Loss appetite    Chantix [Varenicline Tartrate] Nausea And Vomiting       Review of Systems   Constitutional: Negative for activity change, appetite change and fever. HENT: Positive for congestion, postnasal drip, rhinorrhea and sinus pressure. Negative for ear pain, facial swelling, sinus pain and sore throat. Eyes: Negative for discharge, redness and itching. Respiratory: Positive for cough, shortness of breath and wheezing. Negative for chest tightness. Cardiovascular: Negative for chest pain, palpitations and leg swelling. Gastrointestinal: Positive for abdominal distention. Negative for abdominal pain, constipation, diarrhea, nausea and vomiting. Musculoskeletal: Negative for myalgias. Skin: Negative for color change. Allergic/Immunologic: Positive for environmental allergies. Neurological: Positive for headaches. Negative for dizziness and light-headedness. Psychiatric/Behavioral: Negative for confusion and sleep disturbance.        Vitals:    04/21/22 1632   BP: 108/70   Site: Right Upper Arm   Position: Sitting   Cuff Size: Medium Adult   Pulse: 66   Resp: 18   SpO2: 96%   Weight: 188 lb (85.3 kg)   Height: 5' 9\" (1.753 m)       Body mass index is 27.76 kg/m². Wt Readings from Last 3 Encounters:   04/21/22 188 lb (85.3 kg)   10/08/21 189 lb (85.7 kg)   10/08/20 192 lb 9.6 oz (87.4 kg)       BP Readings from Last 3 Encounters:   04/21/22 108/70   10/08/21 114/73   10/08/20 114/70       Physical Exam  Vitals reviewed. Constitutional:       General: He is not in acute distress. Appearance: Normal appearance. He is not toxic-appearing. HENT:      Head: Normocephalic and atraumatic. Nose: Congestion and rhinorrhea present. Mouth/Throat:      Mouth: Mucous membranes are moist.      Pharynx: Oropharynx is clear. Eyes:      General:         Right eye: No discharge. Left eye: No discharge. Extraocular Movements: Extraocular movements intact. Conjunctiva/sclera: Conjunctivae normal.      Pupils: Pupils are equal, round, and reactive to light. Neck:      Vascular: No carotid bruit. Cardiovascular:      Rate and Rhythm: Normal rate and regular rhythm. Heart sounds: Normal heart sounds. No murmur heard. No friction rub. No gallop. Pulmonary:      Effort: Pulmonary effort is normal. No respiratory distress. Breath sounds: Wheezing present. Musculoskeletal:         General: Normal range of motion. Right lower leg: No edema. Left lower leg: No edema. Lymphadenopathy:      Cervical: No cervical adenopathy. Skin:     General: Skin is warm and dry. Neurological:      Mental Status: He is alert and oriented to person, place, and time. Psychiatric:         Behavior: Behavior normal.         Thought Content: Thought content normal.         Judgment: Judgment normal.         Assessmentand Plan  Becki Arenas was seen today for shortness of breath and bloated. Diagnoses and all orders for this visit:    Shortness of breath  Chest xray today  Start albuterol  Stop cigars  Elevate head of bed at night  -     XR CHEST (2 VW);  Future  -     albuterol sulfate HFA (VENTOLIN HFA) 108 (90 Base) MCG/ACT inhaler; Inhale 2 puffs into the lungs 4 times daily as needed for Wheezing    Wheezing  Start albuterol  Stop cigars  Elevate head of bed at night  -     albuterol sulfate HFA (VENTOLIN HFA) 108 (90 Base) MCG/ACT inhaler; Inhale 2 puffs into the lungs 4 times daily as needed for Wheezing    Cough in adult  Chest xray today  Start albuterol  Stop cigars  Elevate head of bed at night  -     XR CHEST (2 VW); Future  -     albuterol sulfate HFA (VENTOLIN HFA) 108 (90 Base) MCG/ACT inhaler; Inhale 2 puffs into the lungs 4 times daily as needed for Wheezing    Gastroesophageal reflux disease, unspecified whether esophagitis present  Continue Prilosec  Eat multiple smaller meals instead of one large meal    Abdominal bloating  OTC probiotics    avoid foods that increase flatulence (eg, beans, onions, celery, carrots, raisins, bananas, apricots, prunes, Critz sprouts, wheat germ, pretzels, and bagels). PND (post-nasal drip)  Nasal spray ordered today  -     fluticasone (FLONASE) 50 MCG/ACT nasal spray; 2 sprays by Nasal route daily      Return if symptoms worsen or fail to improve.

## 2022-04-21 NOTE — PATIENT INSTRUCTIONS
Take Mucinex DM or Robitussin DM as needed for cough and congestion. You can also try Flonase Allergy 2 sprays to each nostril once a day for congestion. You can also try saline nose spray for stuffy nose. If you do not have high blood pressure or heard problems, you can try Sudafed or similar decongestant for congestion  You can take Claritin or Zyrtec as needed for drainage. Take over-the-counter acetaminophen and/or ibuprofen for pain and fever. You can try throat lozenges or salt water gargles for any sore throat. Drink lots of fluid and get plenty of rest.  Your symptoms may take 7-10 days to resolve. Contact us if you have a high fever (102 degrees F), shortness of breath, significant pain somewhere, inability to keep liquids down, or other worsening symptoms.

## 2022-05-02 ENCOUNTER — OFFICE VISIT (OUTPATIENT)
Dept: FAMILY MEDICINE CLINIC | Age: 67
End: 2022-05-02
Payer: MEDICARE

## 2022-05-02 VITALS
RESPIRATION RATE: 18 BRPM | SYSTOLIC BLOOD PRESSURE: 106 MMHG | WEIGHT: 188 LBS | HEART RATE: 71 BPM | OXYGEN SATURATION: 93 % | DIASTOLIC BLOOD PRESSURE: 62 MMHG | HEIGHT: 69 IN | BODY MASS INDEX: 27.85 KG/M2

## 2022-05-02 DIAGNOSIS — J20.9 ACUTE BRONCHITIS, UNSPECIFIED ORGANISM: Primary | ICD-10-CM

## 2022-05-02 DIAGNOSIS — J30.9 ALLERGIC RHINITIS, UNSPECIFIED SEASONALITY, UNSPECIFIED TRIGGER: ICD-10-CM

## 2022-05-02 DIAGNOSIS — Z87.891 PERSONAL HISTORY OF TOBACCO USE: ICD-10-CM

## 2022-05-02 DIAGNOSIS — R06.2 WHEEZING: ICD-10-CM

## 2022-05-02 PROCEDURE — G8417 CALC BMI ABV UP PARAM F/U: HCPCS | Performed by: FAMILY MEDICINE

## 2022-05-02 PROCEDURE — 99214 OFFICE O/P EST MOD 30 MIN: CPT | Performed by: FAMILY MEDICINE

## 2022-05-02 PROCEDURE — 1123F ACP DISCUSS/DSCN MKR DOCD: CPT | Performed by: FAMILY MEDICINE

## 2022-05-02 PROCEDURE — 1036F TOBACCO NON-USER: CPT | Performed by: FAMILY MEDICINE

## 2022-05-02 PROCEDURE — 4040F PNEUMOC VAC/ADMIN/RCVD: CPT | Performed by: FAMILY MEDICINE

## 2022-05-02 PROCEDURE — G8427 DOCREV CUR MEDS BY ELIG CLIN: HCPCS | Performed by: FAMILY MEDICINE

## 2022-05-02 PROCEDURE — G0296 VISIT TO DETERM LDCT ELIG: HCPCS | Performed by: FAMILY MEDICINE

## 2022-05-02 PROCEDURE — 3017F COLORECTAL CA SCREEN DOC REV: CPT | Performed by: FAMILY MEDICINE

## 2022-05-02 RX ORDER — PREDNISONE 10 MG/1
TABLET ORAL
Qty: 39 TABLET | Refills: 0 | Status: SHIPPED | OUTPATIENT
Start: 2022-05-02 | End: 2022-05-14

## 2022-05-02 RX ORDER — AMOXICILLIN AND CLAVULANATE POTASSIUM 875; 125 MG/1; MG/1
1 TABLET, FILM COATED ORAL 2 TIMES DAILY
Qty: 20 TABLET | Refills: 0 | Status: SHIPPED | OUTPATIENT
Start: 2022-05-02 | End: 2022-05-12

## 2022-05-02 NOTE — PATIENT INSTRUCTIONS
INSTRUCTIONS  NEXT APPOINTMENT: Please schedule check-up in 2 months with Jillian Zuniga (Nurse Practitioner). · PLEASE TAKE THIS FORM TO CHECK-OUT WINDOW TO SCHEDULE NEXT VISIT. · Take steroid taper and antibiotics. · Schedule PFT and CT lung in a month when lungs better. · For allergies, patient may take OTC antihistamines such as Claritin/Allovert/loratidine or Zyrtec/certrizine or Allegra/fexofenadine. If allergies severe, may also take OTC Benadryl/diphenhydramine. · Keep using nasal spray. What is lung cancer screening? Lung cancer screening is a way in which doctors check the lungs for early signs of cancer in people who have no symptoms of lung cancer. A low-dose CT scan uses much less radiation than a normal CT scan and shows a more detailed image of the lungs than a standard X-ray. The goal of lung cancer screening is to find cancer early, before it has a chance to grow, spread, or cause problems. One large study found that smokers who were screened with low-dose CT scans were less likely to die of lung cancer than those who were screened with standard X-ray. Below is a summary of the things you need to know regarding screening for lung cancer with low-dose computed tomography (LDCT). This is a screening program that involves routine annual screening with LDCT studies of the lung. The LDCTs are done using low-dose radiation that is not thought to increase your cancer risk. If you have other serious medical conditions (other cancers, congestive heart failure) that limit your life expectancy to less than 10 years, you should not undergo lung cancer screening with LDCT. The chance is 20%-60% that the LDCT result will show abnormalities. This would require additional testing which could include repeat imaging or even invasive procedures. Most (about 95%) of \"abnormal\" LDCT results are false in the sense that no lung cancer is ultimately found.   Additionally, some (about 10%) of the cancers found would not affect your life expectancy, even if undetected and untreated. If you are still smoking, the single most important thing that you can do to reduce your risk of dying of lung cancer is to quit. For this screening to be covered by Medicare and most other insurers, strict criteria must be met. If you do not meet these criteria, but still wish to undergo LDCT testing, you will be required to sign a waiver indicating your willingness to pay for the scan.

## 2022-05-02 NOTE — PROGRESS NOTES
PROBLEM VISIT NOTE     Subjective:     Chief Complaint   Patient presents with    Sinus Problem     Pt was seen on 04/01/2022 for shortness of breath. Pt was given inhailer and nasel spray. Pt said that he has really bad sinus pressure, coughing up mocus, pt said its mostly clear and thick, very rarely it will be brown. Pt said he seems to be fine during the day but at night it seems to be worse. Radha Robles is a 77 y.o. male. symptoms started 3 weeks ago after cleaning up old leaves   · Unable to lay down due to breathing. Not shortness of breath during day. Wheeze and shortness of breath at night. · Albuterol not help much. · CXR clear. · Home tests neg for COVID  · O2 at home over 91%    Health Maintenance Due   Topic Date Due    Low dose CT lung screening  Never done    Shingles vaccine (2 of 3) 09/10/2015    Pneumococcal 65+ years Vaccine (1 - PCV) 11/11/2020    AAA screen  11/11/2020     CHART REVIEW   reports that he quit smoking about 22 months ago. His smoking use included cigarettes. He started smoking about 58 years ago. He has a 20.00 pack-year smoking history. He has never used smokeless tobacco.  Health Maintenance Due   Topic Date Due    Low dose CT lung screening  Never done    Shingles vaccine (2 of 3) 09/10/2015    Pneumococcal 65+ years Vaccine (1 - PCV) 11/11/2020    AAA screen  11/11/2020     Current Outpatient Medications   Medication Instructions    albuterol sulfate HFA (VENTOLIN HFA) 108 (90 Base) MCG/ACT inhaler 2 puffs, Inhalation, 4 TIMES DAILY PRN    amoxicillin-clavulanate (AUGMENTIN) 875-125 MG per tablet 1 tablet, Oral, 2 TIMES DAILY    clobetasol (TEMOVATE) 0.05 % cream Apply topically 2 times daily.     fluticasone (FLONASE) 50 MCG/ACT nasal spray 2 sprays, Nasal, DAILY    lisinopril-hydroCHLOROthiazide (PRINZIDE;ZESTORETIC) 20-25 MG per tablet TAKE ONE TABLET BY MOUTH DAILY    omeprazole (PRILOSEC) 20 MG delayed release capsule TAKE ONE CAPSULE BY MOUTH DAILY    predniSONE (DELTASONE) 10 MG tablet Take 3 twice a day for 3 days, then 2 twice a day for 3 days, the 1 twice a day for 3 days, then 1 daily for 3 days, then STOP.  vitamin D (CHOLECALCIFEROL) 10,000 Units, Oral, DAILY     LAST LABS  LDL Calculated   Date Value Ref Range Status   10/08/2021 129 (H) <100 mg/dL Final     Lab Results   Component Value Date    HDL 29 (L) 10/08/2021     Lab Results   Component Value Date    TRIG 121 10/08/2021     Lab Results   Component Value Date     10/08/2021    K 4.2 10/08/2021    CREATININE 1.0 10/08/2021     Lab Results   Component Value Date    WBC 7.1 04/24/2019    HGB 15.2 04/24/2019     04/24/2019     Lab Results   Component Value Date    ALT 20 10/08/2021    AST 15 10/08/2021    ALKPHOS 81 10/08/2021    BILITOT 0.7 10/08/2021     TSH (uIU/mL)   Date Value   01/06/2016 1.35     No results found for: LABA1C     Objective:   PHYSICAL EXAM   /62 (Site: Right Upper Arm, Position: Sitting, Cuff Size: Large Adult)   Pulse 71   Resp 18   Ht 5' 9\" (1.753 m)   Wt 188 lb (85.3 kg)   SpO2 93%   BMI 27.76 kg/m²   BP Readings from Last 5 Encounters:   05/02/22 106/62   04/21/22 108/70   10/08/21 114/73   10/08/20 114/70   09/10/20 110/74     Wt Readings from Last 5 Encounters:   05/02/22 188 lb (85.3 kg)   04/21/22 188 lb (85.3 kg)   10/08/21 189 lb (85.7 kg)   10/08/20 192 lb 9.6 oz (87.4 kg)   09/10/20 194 lb (88 kg)      GENERAL:   · well-developed, well-nourished, alert, no distress. LUNGS:    · Breathing unlabored  · Diffuse wheezing and fair air movement  CARDIOVASC:   · regular rate and rhythm  SKIN: warm and dry     Assessment and Plan:      Diagnosis Orders   1. Acute bronchitis, unspecified organism  amoxicillin-clavulanate (AUGMENTIN) 875-125 MG per tablet   2. Wheezing  predniSONE (DELTASONE) 10 MG tablet    Full PFT Study With Bronchodilator   3.  Allergic rhinitis, unspecified seasonality, unspecified trigger        INSTRUCTIONS  NEXT APPOINTMENT: Please schedule check-up in 2 months with Luciano Moran (Nurse Practitioner). · PLEASE TAKE THIS FORM TO CHECK-OUT WINDOW TO SCHEDULE NEXT VISIT. · Take steroid taper and antibiotics. · Schedule PFT and CT lung in a month when lungs better. · For allergies, patient may take OTC antihistamines such as Claritin/Allovert/loratidine or Zyrtec/certrizine or Allegra/fexofenadine. If allergies severe, may also take OTC Benadryl/diphenhydramine. · Keep using nasal spray. Low Dose CT (LDCT) Lung Screening criteria met:     Age 50-77(Medicare) or 50-80 ([facility])   Pack year smoking >20   Still smoking or less than 15 year since quit   No sign or symptoms of lung cancer   > 11 months since last LDCT     Risks and benefits of lung cancer screening with LDCT scans discussed:    Significance of positive screen - False-positive LDCT results often occur. 95% of all positive results do not lead to a diagnosis of cancer. Usually further imaging can resolve most false-positive results; however, some patients may require invasive procedures. Over diagnosis risk - 10% to 12% of screen-detected lung cancer cases are over diagnosed--that is, the cancer would not have been detected in the patient's lifetime without the screening. Need for follow up screens annually to continue lung cancer screening effectiveness     Risks associated with radiation from annual LDCT- Radiation exposure is about the same as for a mammogram, which is about 1/3 of the annual background radiation exposure from everyday life. Starting screening at age 54 is not likely to increase cancer risk from radiation exposure. Patients with comorbidities resulting in life expectancy of < 10 years, or that would preclude treatment of an abnormality identified on CT, should not be screened due to lack of benefit.     To obtain maximal benefit from this screening, smoking cessation and long-term abstinence from smoking is critical        ·

## 2022-05-09 RX ORDER — OMEPRAZOLE 20 MG/1
CAPSULE, DELAYED RELEASE ORAL
Qty: 90 CAPSULE | Refills: 3 | Status: SHIPPED | OUTPATIENT
Start: 2022-05-09

## 2022-05-11 DIAGNOSIS — I10 ESSENTIAL HYPERTENSION: ICD-10-CM

## 2022-05-12 RX ORDER — LISINOPRIL AND HYDROCHLOROTHIAZIDE 25; 20 MG/1; MG/1
TABLET ORAL
Qty: 90 TABLET | Refills: 0 | Status: SHIPPED | OUTPATIENT
Start: 2022-05-12 | End: 2022-06-20

## 2022-05-23 ENCOUNTER — HOSPITAL ENCOUNTER (OUTPATIENT)
Dept: PULMONOLOGY | Age: 67
Discharge: HOME | End: 2022-05-23
Payer: MEDICARE

## 2022-05-23 ENCOUNTER — HOSPITAL ENCOUNTER (OUTPATIENT)
Dept: CT IMAGING | Age: 67
Discharge: HOME | End: 2022-05-23
Payer: MEDICARE

## 2022-05-23 DIAGNOSIS — Z87.891 PERSONAL HISTORY OF TOBACCO USE: ICD-10-CM

## 2022-05-23 DIAGNOSIS — R06.2 WHEEZING: ICD-10-CM

## 2022-05-23 PROBLEM — Z91.89 PULMONARY NODULE LESS THAN 1 CM IN DIAMETER WITH LOW RISK FOR MALIGNANT NEOPLASM: Status: ACTIVE | Noted: 2022-05-23

## 2022-05-23 PROBLEM — R91.1 PULMONARY NODULE LESS THAN 1 CM IN DIAMETER WITH LOW RISK FOR MALIGNANT NEOPLASM: Status: ACTIVE | Noted: 2022-05-23

## 2022-05-23 LAB
DLCO %PRED: 81 %
DLCO PRED: NORMAL
DLCO/VA %PRED: 83 %
DLCO/VA PRED: NORMAL
DLCO/VA: 3.83 ML/MIN/MMHG
DLCO: 25.67 ML/MIN/MMHG
EXPIRATORY TIME-POST: NORMAL
EXPIRATORY TIME: NORMAL
FEF 25-75% %CHNG: NORMAL
FEF 25-75% %PRED-POST: NORMAL
FEF 25-75% %PRED-PRE: NORMAL
FEF 25-75% PRED: NORMAL
FEF 25-75%-POST: NORMAL
FEF 25-75%-PRE: NORMAL
FEV1 %PRED-POST: 83 %
FEV1 %PRED-PRE: 75 %
FEV1 PRED: NORMAL
FEV1-POST: NORMAL
FEV1-PRE: NORMAL
FEV1/FVC %PRED-POST: NORMAL
FEV1/FVC %PRED-PRE: NORMAL
FEV1/FVC PRED: NORMAL
FEV1/FVC-POST: 65 %
FEV1/FVC-PRE: 68 %
FVC %PRED-POST: NORMAL
FVC %PRED-PRE: NORMAL
FVC PRED: NORMAL
FVC-POST: NORMAL
FVC-PRE: NORMAL
GAW %PRED: NORMAL
GAW PRED: NORMAL
GAW: NORMAL
IC %PRED: NORMAL
IC PRED: NORMAL
IC: NORMAL
MEP: NORMAL
MIP: NORMAL
MVV %PRED-PRE: NORMAL
MVV PRED: NORMAL
MVV-PRE: NORMAL
PEF %PRED-POST: NORMAL
PEF %PRED-PRE: NORMAL
PEF PRED: NORMAL
PEF%CHNG: NORMAL
PEF-POST: NORMAL
PEF-PRE: NORMAL
RAW %PRED: NORMAL
RAW PRED: NORMAL
RAW: NORMAL
RV %PRED: NORMAL
RV PRED: NORMAL
RV: NORMAL
SVC %PRED: NORMAL
SVC PRED: NORMAL
SVC: NORMAL
TLC %PRED: 98 %
TLC PRED: NORMAL
TLC: NORMAL
VA %PRED: 97 %
VA PRED: NORMAL
VA: 6.7 L
VTG %PRED: NORMAL
VTG PRED: NORMAL
VTG: NORMAL

## 2022-05-23 PROCEDURE — 6370000000 HC RX 637 (ALT 250 FOR IP): Performed by: FAMILY MEDICINE

## 2022-05-23 PROCEDURE — 94729 DIFFUSING CAPACITY: CPT

## 2022-05-23 PROCEDURE — 71271 CT THORAX LUNG CANCER SCR C-: CPT

## 2022-05-23 PROCEDURE — 94726 PLETHYSMOGRAPHY LUNG VOLUMES: CPT

## 2022-05-23 PROCEDURE — 94060 EVALUATION OF WHEEZING: CPT

## 2022-05-23 PROCEDURE — 94640 AIRWAY INHALATION TREATMENT: CPT

## 2022-05-23 PROCEDURE — 94664 DEMO&/EVAL PT USE INHALER: CPT

## 2022-05-23 RX ORDER — ALBUTEROL SULFATE 90 UG/1
4 AEROSOL, METERED RESPIRATORY (INHALATION) ONCE
Status: COMPLETED | OUTPATIENT
Start: 2022-05-23 | End: 2022-05-23

## 2022-05-23 RX ADMIN — ALBUTEROL SULFATE 4 PUFF: 90 AEROSOL, METERED RESPIRATORY (INHALATION) at 11:13

## 2022-05-23 ASSESSMENT — PULMONARY FUNCTION TESTS
FEV1_PERCENT_PREDICTED_POST: 83
FEV1/FVC_POST: 65
FEV1_PERCENT_PREDICTED_PRE: 75
FEV1/FVC_PRE: 68

## 2022-05-25 ENCOUNTER — TELEPHONE (OUTPATIENT)
Dept: CASE MANAGEMENT | Age: 67
End: 2022-05-25

## 2022-05-25 NOTE — TELEPHONE ENCOUNTER
CT Lung Cancer Screening completed on 5/23/2022, ordering provider, Dr Nydia Brooks, reviewed radiology results with recommendations & office notified patient of results with recommendations. Smoking history reviewed.

## 2022-05-27 PROCEDURE — 94060 EVALUATION OF WHEEZING: CPT | Performed by: INTERNAL MEDICINE

## 2022-05-27 PROCEDURE — 94729 DIFFUSING CAPACITY: CPT | Performed by: INTERNAL MEDICINE

## 2022-05-27 PROCEDURE — 94726 PLETHYSMOGRAPHY LUNG VOLUMES: CPT | Performed by: INTERNAL MEDICINE

## 2022-05-27 NOTE — PROCEDURES
Spirometry was acceptable and reproducible by ATS standards      Spirometry/Flow volume loop:  Spirometry and flow volume loops consistent with mild airflow obstruction. FEV1 of 83%, and FVC 97%. There is a significant bronchodilator response in FVC with an improvement of 540 cc and 14%. Lung volumes:  Lung volumes within normal limit without evidence of air trapping or hyperinflation. Diffusing capacity:  Diffusing capacity is preserved. Summary:  By Minus Zayda criteria there is mild airflow obstruction though this is mild and of unclear clinical significance. There is also significant bronchodilator response    FEV1 %Pred-Post   Date Value Ref Range Status   05/23/2022 83 % Final     FEV1/FVC-Post   Date Value Ref Range Status   05/23/2022 65 % Final     TLC %Pred   Date Value Ref Range Status   05/23/2022 98 % Final     DLCO/VA %Pred   Date Value Ref Range Status   05/23/2022 83 % Final           OBSTRUCTION % Predicted FEV1   MILD >70%   MODERATE 60-69%   MODERATELY-SEVERE 50-59%   SEVERE 35-49%   VERY SEVERE <35%         RESTRICTION % Predicted TLC   MILD 66-80%   MODERATE 54-65%   MODERATELY-SEVERE <54%                 DIFFUSION CAPACITY DLCO % Pred   MILD >60% AND < LLN   MODERATE 40-60%   SEVERE <40%       PFT data will be scanned into the media tab under this encounter. Please see the scanned data for numerical values.      Cathi Corbin MD  [facility] Pulmonary, Sleep and Critical Care Medicine

## 2022-05-31 DIAGNOSIS — R06.01 ORTHOPNEA: ICD-10-CM

## 2022-05-31 DIAGNOSIS — R06.02 SHORTNESS OF BREATH: Primary | ICD-10-CM

## 2022-06-17 ENCOUNTER — HOSPITAL ENCOUNTER (EMERGENCY)
Age: 67
Discharge: HOME | End: 2022-06-17
Attending: EMERGENCY MEDICINE
Payer: MEDICARE

## 2022-06-17 ENCOUNTER — APPOINTMENT (OUTPATIENT)
Dept: GENERAL RADIOLOGY | Age: 67
End: 2022-06-17
Payer: MEDICARE

## 2022-06-17 VITALS
DIASTOLIC BLOOD PRESSURE: 66 MMHG | WEIGHT: 184.53 LBS | BODY MASS INDEX: 27.33 KG/M2 | HEART RATE: 69 BPM | TEMPERATURE: 97.5 F | SYSTOLIC BLOOD PRESSURE: 102 MMHG | OXYGEN SATURATION: 95 % | HEIGHT: 69 IN | RESPIRATION RATE: 16 BRPM

## 2022-06-17 DIAGNOSIS — R06.02 SHORTNESS OF BREATH: Primary | ICD-10-CM

## 2022-06-17 DIAGNOSIS — R05.9 COUGH: ICD-10-CM

## 2022-06-17 LAB
ANION GAP SERPL CALCULATED.3IONS-SCNC: 14 MMOL/L (ref 3–16)
BASOPHILS ABSOLUTE: 0 K/UL (ref 0–0.2)
BASOPHILS RELATIVE PERCENT: 0.4 %
BUN BLDV-MCNC: 18 MG/DL (ref 7–20)
CALCIUM SERPL-MCNC: 9.2 MG/DL (ref 8.3–10.6)
CHLORIDE BLD-SCNC: 103 MMOL/L (ref 99–110)
CO2: 23 MMOL/L (ref 21–32)
CREAT SERPL-MCNC: 1 MG/DL (ref 0.8–1.3)
EKG ATRIAL RATE: 65 BPM
EKG DIAGNOSIS: NORMAL
EKG P AXIS: 43 DEGREES
EKG P-R INTERVAL: 120 MS
EKG Q-T INTERVAL: 420 MS
EKG QRS DURATION: 102 MS
EKG QTC CALCULATION (BAZETT): 436 MS
EKG R AXIS: -20 DEGREES
EKG T AXIS: 49 DEGREES
EKG VENTRICULAR RATE: 65 BPM
EOSINOPHILS ABSOLUTE: 0.1 K/UL (ref 0–0.6)
EOSINOPHILS RELATIVE PERCENT: 1.2 %
GFR AFRICAN AMERICAN: >60
GFR NON-AFRICAN AMERICAN: >60
GLUCOSE BLD-MCNC: 117 MG/DL (ref 70–99)
HCT VFR BLD CALC: 40.1 % (ref 40.5–52.5)
HEMOGLOBIN: 14.1 G/DL (ref 13.5–17.5)
INR BLD: 0.82 (ref 0.87–1.14)
LYMPHOCYTES ABSOLUTE: 1.3 K/UL (ref 1–5.1)
LYMPHOCYTES RELATIVE PERCENT: 20.7 %
MCH RBC QN AUTO: 31.6 PG (ref 26–34)
MCHC RBC AUTO-ENTMCNC: 35.1 G/DL (ref 31–36)
MCV RBC AUTO: 89.8 FL (ref 80–100)
MONOCYTES ABSOLUTE: 0.4 K/UL (ref 0–1.3)
MONOCYTES RELATIVE PERCENT: 6.8 %
NEUTROPHILS ABSOLUTE: 4.6 K/UL (ref 1.7–7.7)
NEUTROPHILS RELATIVE PERCENT: 70.9 %
PDW BLD-RTO: 13.3 % (ref 12.4–15.4)
PLATELET # BLD: 189 K/UL (ref 135–450)
PMV BLD AUTO: 7.9 FL (ref 5–10.5)
POTASSIUM SERPL-SCNC: 3.6 MMOL/L (ref 3.5–5.1)
PRO-BNP: 86 PG/ML (ref 0–124)
PROTHROMBIN TIME: 11.2 SEC (ref 11.7–14.5)
RBC # BLD: 4.47 M/UL (ref 4.2–5.9)
SODIUM BLD-SCNC: 140 MMOL/L (ref 136–145)
TROPONIN: <0.01 NG/ML
WBC # BLD: 6.5 K/UL (ref 4–11)

## 2022-06-17 PROCEDURE — 85610 PROTHROMBIN TIME: CPT

## 2022-06-17 PROCEDURE — 99285 EMERGENCY DEPT VISIT HI MDM: CPT

## 2022-06-17 PROCEDURE — 84484 ASSAY OF TROPONIN QUANT: CPT

## 2022-06-17 PROCEDURE — 93005 ELECTROCARDIOGRAM TRACING: CPT | Performed by: EMERGENCY MEDICINE

## 2022-06-17 PROCEDURE — 85025 COMPLETE CBC W/AUTO DIFF WBC: CPT

## 2022-06-17 PROCEDURE — 93010 ELECTROCARDIOGRAM REPORT: CPT | Performed by: INTERNAL MEDICINE

## 2022-06-17 PROCEDURE — 80048 BASIC METABOLIC PNL TOTAL CA: CPT

## 2022-06-17 PROCEDURE — 71046 X-RAY EXAM CHEST 2 VIEWS: CPT

## 2022-06-17 PROCEDURE — 83880 ASSAY OF NATRIURETIC PEPTIDE: CPT

## 2022-06-17 RX ORDER — PREDNISONE 50 MG/1
50 TABLET ORAL DAILY
Qty: 5 TABLET | Refills: 0 | Status: SHIPPED | OUTPATIENT
Start: 2022-06-17 | End: 2022-06-22

## 2022-06-17 NOTE — ED PROVIDER NOTES
[facility]    CHIEF COMPLAINT  Shortness of Breath (for a couple of months) and Cough       HISTORY OF PRESENT ILLNESS  Rosalba Love is a 77 y.o. male who presents to the ED with complaint of SOB and cough. The symptoms have been present for months. Symptoms seem to be worse at night however they also occur during the day. The cough is productive of clear sputum. He has a 20-year pack smoking history. He tried albuterol and that does not seem to help. He has also tried antihistamines. He has no chest pain at this time. He had a CT lung screen performed 2022 that showed tiny lung nodules with plans for repeat CT scan in 6 months. He recently completed a steroid course that seemed to help his symptoms. No other complaints, modifying factors or associated symptoms. I have reviewed the following from the nursing documentation:    Past Medical History:   Diagnosis Date    GERD (gastroesophageal reflux disease)     Hypercholesteremia     Hyperlipidemia LDL goal <130 2011    Hypertension     Tobacco abuse     Vitamin D deficiency      History reviewed. No pertinent surgical history.   Family History   Problem Relation Age of Onset    Cancer Mother         skin    Heart Failure Father     Hypertension Father     Lung Cancer Brother 61    Heart Failure Brother     Heart Surgery Sister     Hypertension Sister     Heart Disease Sister         bicuspid aortic valve     Social History     Socioeconomic History    Marital status:      Spouse name: Not on file    Number of children: Not on file    Years of education: Not on file    Highest education level: Not on file   Occupational History    Not on file   Tobacco Use    Smoking status: Former Smoker     Packs/day: 0.50     Years: 40.00     Pack years: 20.00     Types: Cigarettes     Start date: 1964     Quit date: 2020     Years since quittin.9    Smokeless tobacco: Never Used    Tobacco comment: quit about 14 weeks ago   Substance and Sexual Activity    Alcohol use: Yes     Alcohol/week: 0.0 standard drinks     Comment: occassional up to 5 at a time    Drug use: No    Sexual activity: Yes     Partners: Female     Comment:    Other Topics Concern    Not on file   Social History Narrative    Lives with spouse. Exercise: walking at work     Seatbelt use: Always    Living will: yes,   copy requested     Social Determinants of Health     Financial Resource Strain:     Difficulty of Paying Living Expenses: Not on file   Food Insecurity:     Worried About Running Out of Food in the Last Year: Not on file    Tom of Food in the Last Year: Not on file   Transportation Needs:     Lack of Transportation (Medical): Not on file    Lack of Transportation (Non-Medical): Not on file   Physical Activity:     Days of Exercise per Week: Not on file    Minutes of Exercise per Session: Not on file   Stress:     Feeling of Stress : Not on file   Social Connections:     Frequency of Communication with Friends and Family: Not on file    Frequency of Social Gatherings with Friends and Family: Not on file    Attends Yazidi Services: Not on file    Active Member of [address] [place] [address] or Organizations: Not on file    Attends Club or Organization Meetings: Not on file    Marital Status: Not on file   Intimate Partner Violence:     Fear of Current or Ex-Partner: Not on file    Emotionally Abused: Not on file    Physically Abused: Not on file    Sexually Abused: Not on file   Housing Stability:     Unable to Pay for Housing in the Last Year: Not on file    Number of [place] in the Last Year: Not on file    Unstable Housing in the Last Year: Not on file     No current facility-administered medications for this encounter.      Current Outpatient Medications   Medication Sig Dispense Refill    predniSONE (DELTASONE) 50 MG tablet Take 1 tablet by mouth daily for 5 days 5 tablet 0    montelukast (SINGULAIR) 10 MG tablet Take 1 tablet by mouth daily 30 tablet 0    losartan-hydroCHLOROthiazide (HYZAAR) 50-12.5 MG per tablet Take 1 tablet by mouth daily REPLACING LISINOPRIL 30 tablet 5    budesonide-formoterol (SYMBICORT) 80-4.5 MCG/ACT AERO Inhale 2 puffs into the lungs 2 times daily 10.2 g 3    lisinopril-hydroCHLOROthiazide (PRINZIDE;ZESTORETIC) 20-25 MG per tablet TAKE ONE TABLET BY MOUTH DAILY 90 tablet 0    omeprazole (PRILOSEC) 20 MG delayed release capsule TAKE ONE CAPSULE BY MOUTH DAILY 90 capsule 3    albuterol sulfate HFA (VENTOLIN HFA) 108 (90 Base) MCG/ACT inhaler Inhale 2 puffs into the lungs 4 times daily as needed for Wheezing 18 g 5    fluticasone (FLONASE) 50 MCG/ACT nasal spray 2 sprays by Nasal route daily 1 each 1    clobetasol (TEMOVATE) 0.05 % cream Apply topically 2 times daily. 60 g 3    vitamin D (CHOLECALCIFEROL) 250 MCG (18046 UT) CAPS capsule Take 1 capsule by mouth daily 90 capsule 3     Allergies   Allergen Reactions    Atorvastatin Other (See Comments)     Loss appetite    Chantix [Varenicline Tartrate] Nausea And Vomiting       REVIEW OF SYSTEMS  10 systems reviewed, pertinent positives and negatives per HPI, otherwise noted to be negative. PHYSICAL EXAM  ED Triage Vitals [06/17/22 1204]   BP Temp Temp Source Heart Rate Resp SpO2 Height Weight   102/66 97.5 °F (36.4 °C) Temporal 69 16 95 % 5' 9\" (1.753 m) 184 lb 8.4 oz (83.7 kg)     General appearance: Awake and alert. Cooperative. No acute distress. HENT: Normocephalic. Atraumatic. Mucous membranes are moist.  Neck: Supple. Eyes: PERRL. EOMI. Heart/Chest: RRR. No murmurs. Lungs: Respirations unlabored. CTAB. Good air exchange. Speaking comfortably in full sentences. Abdomen: Soft. Non-tender. Non-distended. No rebound or guarding. Musculoskeletal: No extremity edema. No deformity. No tenderness in the extremities. All extremities neurovascularly intact. Skin: Warm and dry.  No acute rashes. Neurological: Alert and oriented. CN II-XII intact. Strength 5/5 bilateral upper and lower extremities. Sensation intact to light touch. Gait normal.  Psychiatric: Mood/affect: normal      LABS  I have reviewed all labs for this visit.    Results for orders placed or performed during the hospital encounter of 16/52/24   Basic Metabolic Panel   Result Value Ref Range    Sodium 140 136 - 145 mmol/L    Potassium 3.6 3.5 - 5.1 mmol/L    Chloride 103 99 - 110 mmol/L    CO2 23 21 - 32 mmol/L    Anion Gap 14 3 - 16    Glucose 117 (H) 70 - 99 mg/dL    BUN 18 7 - 20 mg/dL    CREATININE 1.0 0.8 - 1.3 mg/dL    GFR Non-African American >60 >60    GFR African American >60 >60    Calcium 9.2 8.3 - 10.6 mg/dL   Brain Natriuretic Peptide   Result Value Ref Range    Pro-BNP 86 0 - 124 pg/mL   CBC with Auto Differential   Result Value Ref Range    WBC 6.5 4.0 - 11.0 K/uL    RBC 4.47 4.20 - 5.90 M/uL    Hemoglobin 14.1 13.5 - 17.5 g/dL    Hematocrit 40.1 (L) 40.5 - 52.5 %    MCV 89.8 80.0 - 100.0 fL    MCH 31.6 26.0 - 34.0 pg    MCHC 35.1 31.0 - 36.0 g/dL    RDW 13.3 12.4 - 15.4 %    Platelets 895 126 - 734 K/uL    MPV 7.9 5.0 - 10.5 fL    Neutrophils % 70.9 %    Lymphocytes % 20.7 %    Monocytes % 6.8 %    Eosinophils % 1.2 %    Basophils % 0.4 %    Neutrophils Absolute 4.6 1.7 - 7.7 K/uL    Lymphocytes Absolute 1.3 1.0 - 5.1 K/uL    Monocytes Absolute 0.4 0.0 - 1.3 K/uL    Eosinophils Absolute 0.1 0.0 - 0.6 K/uL    Basophils Absolute 0.0 0.0 - 0.2 K/uL   Troponin   Result Value Ref Range    Troponin <0.01 <0.01 ng/mL   Protime-INR   Result Value Ref Range    Protime 11.2 (L) 11.7 - 14.5 sec    INR 0.82 (L) 0.87 - 1.14   EKG 12 Lead   Result Value Ref Range    Ventricular Rate 65 BPM    Atrial Rate 65 BPM    P-R Interval 120 ms    QRS Duration 102 ms    Q-T Interval 420 ms    QTc Calculation (Bazett) 436 ms    P Axis 43 degrees    R Axis -20 degrees    T Axis 49 degrees    Diagnosis       Normal sinus rhythmNormal ECGNo previous ECGs availableConfirmed by RHONA AWAD, Moses Prajapati ([id number]) on 6/17/2022 12:58:34 PM       RADIOLOGY  I have reviewed all radiographic studies for this visit. XR CHEST (2 VW)   Final Result   COPD. No acute lung disease. The heart, mediastinum and pleural surfaces   appear unremarkable. ED COURSE/MDM  Patient seen and evaluated. Old records reviewed. Labs and imaging reviewed and results discussed with patient/family to extent possible. This is a 49-year-old male who presents with complaint of shortness of breath for many months. On arrival the patient is afebrile and nontoxic in appearance with otherwise reassuring vital signs. His EKG shows no acute ischemic abnormalities. Labs were drawn in triage. Patient's troponin and BNP are both within normal limits. I have low suspicion for ACS given the nonischemic EKG and reassuring serum diagnostic studies. Presentation is inconsistent with pulmonary embolism in the absence of tachycardia, tachypnea, hypoxia, or pleuritic chest pain. No signs or symptoms of DVT. CBC no leukocytosis or anemia. Chest x-ray shows radiographic evidence of COPD but lung fields are clear. Not consistent with pneumonia. As the patient did respond favorably to oral steroids before, will prescribe a prednisone burst.  Will advise patient to continue to use antihistamine medications. Patient already has albuterol prescription at home. In the absence of any significant vital sign abnormalities and given reassuring diagnostics I believe the patient is appropriate for discharge with further management in the outpatient setting. Close follow-up with PCP in several days. Usual strict return precautions for new or worsening symptoms communicated. Is this patient to be included in the SEP-1 Core Measure? No   Exclusion criteria - the patient is NOT to be included for SEP-1 Core Measure due to:   Infection is not suspected    IKesha MD, am the primary clinician of record. All questions were answered and the patient/family expressed understanding and agreement with the plan. PROCEDURES  None    CRITICAL CARE  N/A    CLINICAL IMPRESSION  1. Shortness of breath    2. Cough        DISPOSITION   discharge     Moisés Garcia MD    Note: This chart was created using voice recognition dictation software. Efforts were made by me to ensure accuracy, however some errors may be present due to limitations of this technology and occasionally words are not transcribed correctly.         Moisés Garcia MD  06/19/22 2737

## 2022-06-20 ENCOUNTER — OFFICE VISIT (OUTPATIENT)
Dept: FAMILY MEDICINE CLINIC | Age: 67
End: 2022-06-20
Payer: MEDICARE

## 2022-06-20 VITALS
SYSTOLIC BLOOD PRESSURE: 108 MMHG | HEART RATE: 64 BPM | WEIGHT: 184 LBS | DIASTOLIC BLOOD PRESSURE: 68 MMHG | OXYGEN SATURATION: 97 % | BODY MASS INDEX: 27.25 KG/M2 | HEIGHT: 69 IN

## 2022-06-20 DIAGNOSIS — J44.1 CHRONIC OBSTRUCTIVE PULMONARY DISEASE WITH ACUTE EXACERBATION (HCC): Primary | ICD-10-CM

## 2022-06-20 DIAGNOSIS — K21.9 GASTROESOPHAGEAL REFLUX DISEASE, UNSPECIFIED WHETHER ESOPHAGITIS PRESENT: ICD-10-CM

## 2022-06-20 DIAGNOSIS — R13.13 PHARYNGEAL DYSPHAGIA: ICD-10-CM

## 2022-06-20 PROCEDURE — 99214 OFFICE O/P EST MOD 30 MIN: CPT | Performed by: FAMILY MEDICINE

## 2022-06-20 PROCEDURE — 1036F TOBACCO NON-USER: CPT | Performed by: FAMILY MEDICINE

## 2022-06-20 PROCEDURE — 1123F ACP DISCUSS/DSCN MKR DOCD: CPT | Performed by: FAMILY MEDICINE

## 2022-06-20 PROCEDURE — 3017F COLORECTAL CA SCREEN DOC REV: CPT | Performed by: FAMILY MEDICINE

## 2022-06-20 PROCEDURE — 3023F SPIROM DOC REV: CPT | Performed by: FAMILY MEDICINE

## 2022-06-20 PROCEDURE — G8427 DOCREV CUR MEDS BY ELIG CLIN: HCPCS | Performed by: FAMILY MEDICINE

## 2022-06-20 PROCEDURE — G8417 CALC BMI ABV UP PARAM F/U: HCPCS | Performed by: FAMILY MEDICINE

## 2022-06-20 RX ORDER — OMEPRAZOLE 40 MG/1
40 CAPSULE, DELAYED RELEASE ORAL DAILY
Qty: 90 CAPSULE | Refills: 3 | Status: SHIPPED | OUTPATIENT
Start: 2022-06-20 | End: 2023-06-20

## 2022-06-20 NOTE — PROGRESS NOTES
SICK VISIT    Subjective:      Chief Complaint   Patient presents with    ED Follow-up     Red - went to ER friday, told COPD, was put on Prednisone - has appt with Pulmonology and ENT tomorrow      Theodora Shaw is a 77 y.o. male who FU for ER.      TIMELINE  Mid April- Cough and shortness of breath started after working with moldy leaves. 4/21- PCP Rx albuterol helped  4/21 CXR No acute cardiopulmonary abnormality. 5/2- PCP still unable to lay flat due to cough and shortness of breath; Rx prednisone cleared it all up but then returned slowly   5/23- CT lung- Mild underlying emphysema is seen  5/23 PFT  mild airflow obstruction though this is mild and of unclear clinical significance. There is also significant bronchodilator response  6/17 CXR Redemonstration of hyperinflation with COPD. Prednisone with near complete relief of symptoms     Feels something in throat most of times. Not productive. Feeling better on prednisone from ER 6/17. Better breathing and swallow and throat clear. PFT with RAD and albuterol response. Started Symbicort 6/15 then just stopped because made him hoarse and cough more so stopped after a few days. Cough more at night. Awakens wheezing. R post lat neck pain x 2-4 mo. Hurts to rotate head. TESTING  PFT 5/23/22  Spirometry/Flow volume loop:  Spirometry and flow volume loops consistent with mild airflow obstruction. FEV1 of 83%, and FVC 97%. There is a significant bronchodilator response in FVC with an improvement of 540 cc and 14%.       Lung volumes:  Lung volumes within normal limit without evidence of air trapping or hyperinflation.     Diffusing capacity:  Diffusing capacity is preserved.     Summary:  By Bolivar Grooms criteria there is mild airflow obstruction though this is mild and of unclear clinical significance.   There is also significant bronchodilator response           FEV1 %Pred-Post   Date Value Ref Range Status   05/23/2022 83 % Final            FEV1/FVC-Post Date Value Ref Range Status   2022 65 % Final            TLC %Pred   Date Value Ref Range Status   2022 98 % Final            DLCO/VA %Pred   Date Value Ref Range Status   2022 83 % Final         CT 22  Lungs/Pleura:  Mild underlying emphysema is seen.  No large blebs or bulla. Scattered noncalcified pulmonary nodules are seen on the right, the largest   of which is perifissural in location, along the minor fissure, measuring 7 mm   medial-lateral.     CXR 22  The lungs are well aerated.  The pleural surfaces are normal and no evidence   of a pleural effusion is seen. CXR 22  FINDINGS:   Redemonstration of hyperinflation with COPD.  No evidence of focal   abnormality.  The heart, mediastinum and pleural surfaces appear unremarkable. COPD.  No acute lung disease.  The heart, mediastinum and pleural surfaces   appear unremarkable.      CHART REVIEW  Current Outpatient Medications   Medication Instructions    albuterol sulfate HFA (VENTOLIN HFA) 108 (90 Base) MCG/ACT inhaler 2 puffs, Inhalation, 4 TIMES DAILY PRN    budesonide-formoterol (SYMBICORT) 80-4.5 MCG/ACT AERO 2 puffs, Inhalation, 2 TIMES DAILY    fluticasone (FLONASE) 50 MCG/ACT nasal spray 2 sprays, Nasal, DAILY    losartan-hydroCHLOROthiazide (HYZAAR) 50-12.5 MG per tablet 1 tablet, Oral, DAILY, REPLACING LISINOPRIL    montelukast (SINGULAIR) 10 mg, Oral, DAILY    omeprazole (PRILOSEC) 20 MG delayed release capsule TAKE ONE CAPSULE BY MOUTH DAILY    omeprazole (PRILOSEC) 40 mg, Oral, DAILY    predniSONE (DELTASONE) 50 mg, Oral, DAILY    vitamin D (CHOLECALCIFEROL) 10,000 Units, Oral, DAILY     Social History     Tobacco Use    Smoking status: Former Smoker     Packs/day: 0.50     Years: 40.00     Pack years: 20.00     Types: Cigarettes     Start date: 1964     Quit date: 2020     Years since quittin.9    Smokeless tobacco: Never Used    Tobacco comment: quit about 14 weeks ago   Substance Use Topics    Alcohol use: Yes     Alcohol/week: 0.0 standard drinks     Comment: occassional up to 5 at a time    Drug use: No      LAST LABS  Lab Results   Component Value Date    GLUCOSE 117 (H) 06/17/2022     Lab Results   Component Value Date     06/17/2022    K 3.6 06/17/2022    CREATININE 1.0 06/17/2022     Lab Results   Component Value Date    ALT 20 10/08/2021    AST 15 10/08/2021    ALKPHOS 81 10/08/2021    BILITOT 0.7 10/08/2021     No results found for: LABA1C  Objective:   PHYSICAL EXAM  /68 (Site: Right Upper Arm, Position: Sitting, Cuff Size: Medium Adult)   Pulse 64   Ht 5' 9\" (1.753 m)   Wt 184 lb (83.5 kg)   SpO2 97%   BMI 27.17 kg/m²   Wt Readings from Last 3 Encounters:   06/20/22 184 lb (83.5 kg)   06/17/22 184 lb 8.4 oz (83.7 kg)   05/02/22 188 lb (85.3 kg)     BP Readings from Last 3 Encounters:   06/20/22 108/68   06/17/22 102/66   05/02/22 106/62     GENERAL: well-developed, well-nourished, alert, no distress  EYES: negative findings: lids and lashes normal and conjunctivae and sclerae normal  LUNGS:  Breathing unlabored  · Minimal wheeze, fair air movement  CARDIOVASC: regular rate and rhythm    Assessment/Plan:      Diagnosis Orders   1. Chronic obstructive pulmonary disease with acute exacerbation ([address]  Most likely diagnosis. Encouraged to retry the preventive inhaler. See pulmonary. Simple COPD or an environmental mold trigger. 2. Pharyngeal dysphagia  Not sure if part of #1 or a separate throat issue. Darrian Baker MD, Otolaryngology, [facility]  May need GI if cleared by ENT and symptoms not resolve   5. Gastroesophageal reflux disease, unspecified whether esophagitis present  Possible contributer to symptoms. Will increase PPI   omeprazole (PRILOSEC) 40 MG delayed release capsule     · RESTART the Symbicort  · See ENT and pulmonary tomorrow. · Continue prednisone blast.  · INCREASE omeprazole to 40 mg a day.

## 2022-06-21 ENCOUNTER — OFFICE VISIT (OUTPATIENT)
Dept: PULMONOLOGY | Age: 67
End: 2022-06-21
Payer: MEDICARE

## 2022-06-21 ENCOUNTER — OFFICE VISIT (OUTPATIENT)
Dept: ENT CLINIC | Age: 67
End: 2022-06-21
Payer: MEDICARE

## 2022-06-21 VITALS
BODY MASS INDEX: 27.02 KG/M2 | DIASTOLIC BLOOD PRESSURE: 68 MMHG | HEART RATE: 52 BPM | SYSTOLIC BLOOD PRESSURE: 110 MMHG | HEIGHT: 69 IN | OXYGEN SATURATION: 96 % | TEMPERATURE: 98.6 F | WEIGHT: 182.4 LBS | RESPIRATION RATE: 16 BRPM

## 2022-06-21 VITALS
WEIGHT: 183 LBS | HEIGHT: 69 IN | RESPIRATION RATE: 16 BRPM | HEART RATE: 50 BPM | SYSTOLIC BLOOD PRESSURE: 103 MMHG | BODY MASS INDEX: 27.11 KG/M2 | DIASTOLIC BLOOD PRESSURE: 66 MMHG

## 2022-06-21 DIAGNOSIS — R09.89 CHRONIC THROAT CLEARING: ICD-10-CM

## 2022-06-21 DIAGNOSIS — H61.21 IMPACTED CERUMEN OF RIGHT EAR: ICD-10-CM

## 2022-06-21 DIAGNOSIS — J31.0 CHRONIC RHINITIS: ICD-10-CM

## 2022-06-21 DIAGNOSIS — J45.40 MODERATE PERSISTENT ASTHMA WITHOUT COMPLICATION: ICD-10-CM

## 2022-06-21 DIAGNOSIS — R09.81 NASAL CONGESTION: ICD-10-CM

## 2022-06-21 DIAGNOSIS — H91.93 BILATERAL HEARING LOSS, UNSPECIFIED HEARING LOSS TYPE: ICD-10-CM

## 2022-06-21 DIAGNOSIS — J34.89 NASAL OBSTRUCTION: ICD-10-CM

## 2022-06-21 DIAGNOSIS — J45.40 MODERATE PERSISTENT ASTHMA WITHOUT COMPLICATION: Primary | ICD-10-CM

## 2022-06-21 DIAGNOSIS — R05.3 CHRONIC COUGH: Primary | ICD-10-CM

## 2022-06-21 DIAGNOSIS — J34.2 DEVIATED NASAL SEPTUM: ICD-10-CM

## 2022-06-21 PROCEDURE — 69210 REMOVE IMPACTED EAR WAX UNI: CPT | Performed by: STUDENT IN AN ORGANIZED HEALTH CARE EDUCATION/TRAINING PROGRAM

## 2022-06-21 PROCEDURE — G8427 DOCREV CUR MEDS BY ELIG CLIN: HCPCS | Performed by: INTERNAL MEDICINE

## 2022-06-21 PROCEDURE — 3017F COLORECTAL CA SCREEN DOC REV: CPT | Performed by: INTERNAL MEDICINE

## 2022-06-21 PROCEDURE — 1036F TOBACCO NON-USER: CPT | Performed by: STUDENT IN AN ORGANIZED HEALTH CARE EDUCATION/TRAINING PROGRAM

## 2022-06-21 PROCEDURE — 3017F COLORECTAL CA SCREEN DOC REV: CPT | Performed by: STUDENT IN AN ORGANIZED HEALTH CARE EDUCATION/TRAINING PROGRAM

## 2022-06-21 PROCEDURE — G8417 CALC BMI ABV UP PARAM F/U: HCPCS | Performed by: INTERNAL MEDICINE

## 2022-06-21 PROCEDURE — 31231 NASAL ENDOSCOPY DX: CPT | Performed by: STUDENT IN AN ORGANIZED HEALTH CARE EDUCATION/TRAINING PROGRAM

## 2022-06-21 PROCEDURE — 1123F ACP DISCUSS/DSCN MKR DOCD: CPT | Performed by: INTERNAL MEDICINE

## 2022-06-21 PROCEDURE — 99204 OFFICE O/P NEW MOD 45 MIN: CPT | Performed by: STUDENT IN AN ORGANIZED HEALTH CARE EDUCATION/TRAINING PROGRAM

## 2022-06-21 PROCEDURE — G8427 DOCREV CUR MEDS BY ELIG CLIN: HCPCS | Performed by: STUDENT IN AN ORGANIZED HEALTH CARE EDUCATION/TRAINING PROGRAM

## 2022-06-21 PROCEDURE — G8417 CALC BMI ABV UP PARAM F/U: HCPCS | Performed by: STUDENT IN AN ORGANIZED HEALTH CARE EDUCATION/TRAINING PROGRAM

## 2022-06-21 PROCEDURE — 99204 OFFICE O/P NEW MOD 45 MIN: CPT | Performed by: INTERNAL MEDICINE

## 2022-06-21 PROCEDURE — 1036F TOBACCO NON-USER: CPT | Performed by: INTERNAL MEDICINE

## 2022-06-21 PROCEDURE — 1123F ACP DISCUSS/DSCN MKR DOCD: CPT | Performed by: STUDENT IN AN ORGANIZED HEALTH CARE EDUCATION/TRAINING PROGRAM

## 2022-06-21 RX ORDER — AZELASTINE 1 MG/ML
2 SPRAY, METERED NASAL 2 TIMES DAILY
Qty: 30 ML | Refills: 3 | Status: SHIPPED | OUTPATIENT
Start: 2022-06-21

## 2022-06-21 RX ORDER — FLUTICASONE PROPIONATE 50 MCG
2 SPRAY, SUSPENSION (ML) NASAL 2 TIMES DAILY
Qty: 16 G | Refills: 5 | Status: SHIPPED | OUTPATIENT
Start: 2022-06-21

## 2022-06-21 RX ORDER — IPRATROPIUM BROMIDE AND ALBUTEROL SULFATE 2.5; .5 MG/3ML; MG/3ML
1 SOLUTION RESPIRATORY (INHALATION) EVERY 4 HOURS
Qty: 360 ML | Refills: 11 | Status: SHIPPED | OUTPATIENT
Start: 2022-06-21

## 2022-06-21 RX ORDER — PREDNISONE 10 MG/1
TABLET ORAL
Qty: 30 TABLET | Refills: 0 | Status: SHIPPED | OUTPATIENT
Start: 2022-06-21 | End: 2022-07-01

## 2022-06-21 RX ORDER — FEXOFENADINE HCL 180 MG/1
180 TABLET ORAL DAILY
Qty: 30 TABLET | Refills: 3 | Status: SHIPPED | OUTPATIENT
Start: 2022-06-21

## 2022-06-21 NOTE — PROGRESS NOTES
[address] (:  1955) is a 77 y.o. male, here for evaluation of the following chief complaint(s):  Sinus Problem, Cough, and Oral Swelling      ASSESSMENT/PLAN:  1. Chronic cough  2. Chronic rhinitis  3. Chronic throat clearing  4. Nasal congestion  5. Nasal obstruction      This is a very pleasant 77 y.o. male here today for evaluation of the the above-noted complaints. The patient has evidence of significant sinonasal inflammation on exam today. I have asked the patient to begin twice daily sinus irrigations and will prescribe the patient fluticasone and azelastine, to be used 2 sprays twice a day in each nostril. Depending on how the patient responds to this therapy, we can discuss further medical and/or surgical options, and if imaging would be appropriate. Will add fexofenadine. Asked the patient to follow-up with his pulmonologist.  I think a significant component of his shortness of breath is related to his lung disease. Discussed that a component of his chronic cough could certainly be related to his underlying lung disease. Cerumen removed from right external auditory canal.  Ear precautions provided. The risks, benefits and alternatives to intranasal steroid use were discussed with the patient in detail, including the risks of burning, dryness, crusting, and occasional nosebleeds. Additional rare risks include septal perforations, mucosal atrophy, contact dermatitis as well as the potential risk of glaucoma or cataracts with sustained and prolonged use. The patient expressed understanding of the risks and wished to proceed with therapy. Medical Decision Making:   The following items were considered in medical decision making:  Independent review of images  Review / order clinical lab tests  Review / order radiology tests  Decision to obtain old records  Review and summation of old records as accessed through [facility] if applicable    SUBJECTIVE/OBJECTIVE:  HPI    Omega Luciano is here today for evaluation of issues related to chronic cough, chronic nasal drainage, chronic nasal obstruction and hearing loss. Patient is accompanied by was his wife who provides most of the history. Patient states that over the last several months he has had increasing shortness of breath. He has had a work-up for this including pulmonary function test.  He is soon set to meet with a pulmonologist.  Patient also states that he has nasal drainage which drains down the back of his throat. It is clear and white. He also feels like phlegm gets stuck at the level of his voicebox. He denies voice changes. He denies throat he does have frequent nasal congestion with nasal blockage. I will alternate from side to side. He denies facial pain or pressure. Denies high fever. Patient also has issues related to hearing loss. He has a history of loud noise exposure. He feels like his ears are clogged up. He denies a history of ear surgery. REVIEW OF SYSTEMS  The following systems were reviewed and revealed the following in addition to any already discussed in the HPI:    PHYSICAL EXAM    GENERAL: No acute distress, alert and oriented, no hoarseness, strong voice  EYES: EOMI, Anti-icteric  HENT:   Head: Normocephalic and atraumatic. Face:  Symmetric, facial nerve intact  Right Ear: Impacted cerumen  Left Ear: Normal external ear, normal external auditory canal, intact tympanic membrane with normal mobility and aerated middle ear  Mouth/Oral Cavity:  normal lips, Uvula is midline, no mucosal lesions, no trismus, without dentition, normal salivary quality/flow  Oropharynx/Larynx:  normal oropharynx, normal tonsils  Nose:Normal external nasal appearance. Anterior rhinoscopy shows  a deviated septum preventing view posteriorly. Normal turbinates.   Normal mucosa   NECK: Normal range of motion, no thyromegaly, trachea is midline, no lymphadenopathy, no neck masses, no crepitus  CHEST: Normal respiratory effort, no retractions, breathing comfortably  SKIN: No rashes, normal appearing skin, no evidence of skin lesions/tumors  Neuro:  cranial nerve II-XII intact; normal gait  Cardio:  no edema        PROCEDURE    Use of Operating Microscope and Cerumen Removal CPT code 69210-right  Indications:  Right cerumen impaction obstructing visualization of the tympanic membrane(s). An operating microscope was utilized to visualize the external auditory canals using a speculum. The external auditory canals were occluded with cerumen on the right. The cerumen and debris was removed with instrumentation including suction and currettes under microscopic evaluation. The bilateral tympanic membrane(s) and ossicles are intact. No fluid was visualized in the bilateral middle ears. Nasal Endoscopy (CPT code 34088)       Due to the patients chronic sinus disease and/or history of sinonasal neoplasm for surveillance a nasal endoscopy with or without debridement will be performed to complete a significant physical examination of the patient which cannot be performed by anterior rhinoscopy alone (failure of complete examination of the paranasal sinuses). Failure to provide this procedure may lead to late detection of significant chronic benign disease, acute exacerbation, resolution or failure of early diagnosis of recurrent cancer. The procedure report is present in the body of the chart. Verbal consent was received. After topical anesthesia and decongestion had been obtained using aerosolized 1% lidocaine and oxymetazoline, a 45 degree rigid endoscope was placed into both nares with the patient in a sitting position.  The following was observed:      Septum: intact and deviated to the left with a large posterior septal spur and prominent anterior maxillary crest  Other:   -The inferior and middle turbinates were examined. The middle meatus, and sphenoethmoid recess was examined bilaterally.    -Clear secretions throughout. The scope was advanced and the upper aerodigestive tract was visualized. There is no evidence of vocal cord lesions or lesions of the nasopharynx, oropharynx, larynx or hypopharynx.   -There were no complications. Tolerated well without complication. I attest that I was present for and did the entire procedure myself. This note was generated completely or in part utilizing Dragon dictation speech recognition software. Occasionally, words are mistranscribed and despite editing, the text may contain inaccuracies due to incorrect word recognition. If further clarification is needed please contact the office at [phone]. An electronic signature was used to authenticate this note.     --Rosita Ricketts MD

## 2022-06-21 NOTE — PATIENT INSTRUCTIONS
Follow up in 2 weeks  New inhaler  Nebulizer machine and medicine Cephalexin Pregnancy And Lactation Text: This medication is Pregnancy Category B and considered safe during pregnancy.  It is also excreted in breast milk but can be used safely for shorter doses.

## 2022-06-21 NOTE — PROGRESS NOTES
Cancer in his mother; Heart Disease in his sister; Heart Failure in his brother and father; Heart Surgery in his sister; Hypertension in his father and sister; Lung Cancer (age of onset: 61) in his brother. SOCIAL HISTORY:   reports that he quit smoking about 1 years ago. His smoking use included cigarettes. He started smoking about 58 years ago. He has a 20.00 pack-year smoking history. He has never used smokeless tobacco.      ALLERGIES:  Patient is allergic to atorvastatin and chantix [varenicline tartrate]. REVIEW OF SYSTEMS:  Constitutional: Negative for fever, no wt loss, no night sweats   HENT: Negative for sore throat, difficulty swallowing,   Eyes: Negative for redness, no discharge   Respiratory: Cough, wheezing, shortness of breath g   Cardiovascular: Negative for chest pain, no palpitations   Gastrointestinal: Negative for vomiting, diarrhea   Genitourinary: Negative for hematuria, no dysuria    Musculoskeletal: Negative for arthralgias, no joint swelling   Skin: Negative for rash  LE: no edema   Neurological: Negative for syncope, no tremor, no focal weakness or dysarthria   Hematological: Negative for adenopathy, or bleeding   Psychiatric/Behavorial: Negative for anxiety,    Objective:   PHYSICAL EXAM:  Blood pressure 110/68, pulse 52, temperature 98.6 °F (37 °C), temperature source Infrared, resp. rate 16, height 5' 9\" (1.753 m), weight 182 lb 6.4 oz (82.7 kg), SpO2 96 %.'  Gen: No acute distress  Eyes: PERRL. No sclera icterus. No conjunctival injection. ENT: No discharge. Pharynx clear. External appearance of ears and nose normal.  Neck: Trachea midline. No obvious mass. Resp: Diminished bilaterally with bilateral wheezing  CV: Regular rate. Regular rhythm. No murmur or rub. No edema. GI: Non-tender. Non-distended. No hernia. Skin: Warm, dry, normal texture and turgor. No nodule on exposed extremities. Lymph: No cervical LAD. M/S: No cyanosis. No clubbing. No joint deformity. LE:  no edema   Neuro: no tremor, no focal deficit, awake and alert   Psych: intact judgement and insight. Current Outpatient Medications   Medication Sig Dispense Refill    azelastine (ASTELIN) 0.1 % nasal spray 2 sprays by Nasal route 2 times daily Use in each nostril as directed 30 mL 3    fexofenadine (ALLEGRA) 180 MG tablet Take 1 tablet by mouth daily 30 tablet 3    fluticasone (FLONASE) 50 MCG/ACT nasal spray 2 sprays by Nasal route 2 times daily 16 g 5    ipratropium-albuterol (DUONEB) 0.5-2.5 (3) MG/3ML SOLN nebulizer solution Inhale 3 mLs into the lungs every 4 hours 360 mL 11    predniSONE (DELTASONE) 10 MG tablet Take 40 mg by mouth for 3 days 30 mg for 3 days 20 mg for 3 days 10 mg for 3 days. 30 tablet 0    omeprazole (PRILOSEC) 40 MG delayed release capsule Take 1 capsule by mouth daily 90 capsule 3    predniSONE (DELTASONE) 50 MG tablet Take 1 tablet by mouth daily for 5 days 5 tablet 0    montelukast (SINGULAIR) 10 MG tablet Take 1 tablet by mouth daily 30 tablet 0    losartan-hydroCHLOROthiazide (HYZAAR) 50-12.5 MG per tablet Take 1 tablet by mouth daily REPLACING LISINOPRIL 30 tablet 5    budesonide-formoterol (SYMBICORT) 80-4.5 MCG/ACT AERO Inhale 2 puffs into the lungs 2 times daily 10.2 g 3    omeprazole (PRILOSEC) 20 MG delayed release capsule TAKE ONE CAPSULE BY MOUTH DAILY 90 capsule 3    albuterol sulfate HFA (VENTOLIN HFA) 108 (90 Base) MCG/ACT inhaler Inhale 2 puffs into the lungs 4 times daily as needed for Wheezing 18 g 5    fluticasone (FLONASE) 50 MCG/ACT nasal spray 2 sprays by Nasal route daily 1 each 1    vitamin D (CHOLECALCIFEROL) 250 MCG (46857 UT) CAPS capsule Take 1 capsule by mouth daily 90 capsule 3     No current facility-administered medications for this visit.        Data Reviewed:   CBC and Renal reviewed  Last CBC  Lab Results   Component Value Date    WBC 6.5 06/17/2022    RBC 4.47 06/17/2022    HGB 14.1 06/17/2022    MCV 89.8 06/17/2022     06/17/2022     Last Renal  Lab Results   Component Value Date     06/17/2022    K 3.6 06/17/2022     06/17/2022    CO2 23 06/17/2022    CO2 27 10/08/2021    CO2 26 09/10/2020    BUN 18 06/17/2022    CREATININE 1.0 06/17/2022    GLUCOSE 117 06/17/2022    GLUCOSE 100 06/16/2011    CALCIUM 9.2 06/17/2022       Last ABG  POC Blood Gas: No results found for: POCPH, POCPCO2, POCPO2, POCHCO3, NBEA, [id number]  No results for input(s): PH, PCO2, PO2, HCO3, BE, O2SAT in the last 72 hours. Radiology Review:  Pertinent images / reports were reviewed as a part of this visit. CT Chest w/ contrast: No results found for this or any previous visit. CT Chest w/o contrast: No results found for this or any previous visit. CTPA: No results found for this or any previous visit. CXR PA/LAT: Results for orders placed during the hospital encounter of 06/17/22    XR CHEST (2 VW)    Narrative  EXAMINATION:  TWO XRAY VIEWS OF THE CHEST    6/17/2022 12:38 pm    COMPARISON:  April 21, 2022    HISTORY:  ORDERING SYSTEM PROVIDED HISTORY: SOB  TECHNOLOGIST PROVIDED HISTORY:  Reason for exam:->SOB  Reason for Exam: SOB    FINDINGS:  Redemonstration of hyperinflation with COPD. No evidence of focal  abnormality. The heart, mediastinum and pleural surfaces appear unremarkable. Impression  COPD. No acute lung disease. The heart, mediastinum and pleural surfaces  appear unremarkable. Pulmonary function testing  PFT with mild airflow obstruction and statistically significant postbronchodilator response        This note was transcribed using [id number] PEVESA. Please disregard any translational errors.     Radha Yarbrough MD  [facility] Pulmonary, Sleep and Critical Care

## 2022-06-23 LAB — IGE: 485 KU/L

## 2022-06-24 LAB
2000687N OAK TREE IGE: <0.1 KU/L (ref 0–0.34)
ALLERGEN BERMUDA GRASS IGE: <0.1 KU/L (ref 0–0.34)
ALLERGEN BIRCH IGE: <0.1 KU/L (ref 0–0.34)
ALLERGEN DOG DANDER IGE: <0.1 KU/L (ref 0–0.34)
ALLERGEN GERMAN COCKROACH IGE: 0.48 KU/L (ref 0–0.34)
ALLERGEN HORMODENDRUM IGE: <0.1 KUL/L (ref 0–0.34)
ALLERGEN MOUSE EPITHELIA IGE: <0.1 KU/L (ref 0–0.34)
ALLERGEN PECAN TREE IGE: <0.1 KU/L (ref 0–0.34)
ALLERGEN PIGWEED ROUGH IGE: <0.1 KU/L (ref 0–0.34)
ALLERGEN SHEEP SORREL (W18) IGE: <0.1 KU/L (ref 0–0.34)
ALLERGEN TREE SYCAMORE: <0.1 KU/L (ref 0–0.34)
ALLERGEN WALNUT TREE IGE: <0.1 KU/L (ref 0–0.34)
ALLERGEN WHITE MULBERRY TREE, IGE: <0.1 KU/L (ref 0–0.34)
ALLERGEN, TREE, WHITE ASH IGE: <0.1 KU/L (ref 0–0.34)
ALTERNARIA ALTERNATA: <0.1 KU/L (ref 0–0.34)
ASPERGILLUS FUMIGATUS: <0.1 KU/L (ref 0–0.34)
CAT DANDER ANTIBODY: <0.1 KU/L (ref 0–0.34)
COTTONWOOD TREE: <0.1 KU/L (ref 0–0.34)
D. FARINAE: 0.46 KU/L (ref 0–0.34)
D. PTERONYSSINUS: 0.31 KU/L (ref 0–0.34)
ELM TREE: <0.1 KU/L (ref 0–0.34)
IGE: 523 IU/ML
MAPLE/BOXELDER TREE: <0.1 KU/L (ref 0–0.34)
MOUNTAIN CEDAR TREE: <0.1 KU/L (ref 0–0.34)
MUCOR RACEMOSUS: <0.1 KU/L (ref 0–0.34)
P. NOTATUM: <0.1 KU/L (ref 0–0.34)
RUSSIAN THISTLE: <0.1 KU/L (ref 0–0.34)
SHORT RAGWD(A ARTEMIS.) IGE: <0.1 KU/L (ref 0–0.34)
TIMOTHY GRASS: <0.1 KU/L (ref 0–0.34)

## 2022-06-27 LAB
ALLERGEN BEEF: <0.1 KU/L
ALLERGEN PHOMA BETAE: <0.1 KU/L
ALLERGEN PORK: <0.1 KU/L
ALLERGEN SEE NOTE: NORMAL
ALLERGEN, FEATHER MIX: NEGATIVE KU/L
ASPERGILLUS FLAVUS ANTIBODIES: NORMAL
ASPERGILLUS FUMIGATUS #1: NORMAL
ASPERGILLUS FUMIGATUS #2: NORMAL
ASPERGILLUS FUMIGATUS #3: NORMAL
ASPERGILLUS FUMIGATUS #6: NORMAL
AUREOBASIDIUM PULLULANS: NORMAL
MICROPOLYSPORA FAENI: NORMAL
PIGEON SERUM ABS: NORMAL
SACCHAROMONOSPORA VIRIDIS: NORMAL
THERMOACTINOMYCES CANDIDUS AB: NORMAL
THERMOACTINOMYCES VULGARIS #1: NORMAL

## 2022-07-01 ENCOUNTER — HOSPITAL ENCOUNTER (OUTPATIENT)
Dept: NON INVASIVE DIAGNOSTICS | Age: 67
Discharge: HOME | End: 2022-07-01
Payer: MEDICARE

## 2022-07-01 DIAGNOSIS — R06.01 ORTHOPNEA: ICD-10-CM

## 2022-07-01 DIAGNOSIS — R06.02 SHORTNESS OF BREATH: ICD-10-CM

## 2022-07-01 LAB
LV EF: 55 %
LVEF MODALITY: NORMAL

## 2022-07-01 PROCEDURE — 93306 TTE W/DOPPLER COMPLETE: CPT

## 2022-07-10 DIAGNOSIS — J41.0 SIMPLE CHRONIC BRONCHITIS (HCC): ICD-10-CM

## 2022-07-11 RX ORDER — MONTELUKAST SODIUM 10 MG/1
TABLET ORAL
Qty: 30 TABLET | Refills: 5 | Status: SHIPPED | OUTPATIENT
Start: 2022-07-11 | End: 2022-07-12 | Stop reason: SDUPTHER

## 2022-07-12 ENCOUNTER — OFFICE VISIT (OUTPATIENT)
Dept: PULMONOLOGY | Age: 67
End: 2022-07-12
Payer: MEDICARE

## 2022-07-12 VITALS
HEART RATE: 70 BPM | TEMPERATURE: 98.4 F | OXYGEN SATURATION: 95 % | RESPIRATION RATE: 16 BRPM | SYSTOLIC BLOOD PRESSURE: 108 MMHG | WEIGHT: 182 LBS | DIASTOLIC BLOOD PRESSURE: 70 MMHG | HEIGHT: 69 IN | BODY MASS INDEX: 26.96 KG/M2

## 2022-07-12 DIAGNOSIS — J41.0 SIMPLE CHRONIC BRONCHITIS (HCC): ICD-10-CM

## 2022-07-12 DIAGNOSIS — J45.40 MODERATE PERSISTENT ASTHMA WITHOUT COMPLICATION: Primary | ICD-10-CM

## 2022-07-12 PROCEDURE — 3023F SPIROM DOC REV: CPT | Performed by: INTERNAL MEDICINE

## 2022-07-12 PROCEDURE — 1036F TOBACCO NON-USER: CPT | Performed by: INTERNAL MEDICINE

## 2022-07-12 PROCEDURE — G8417 CALC BMI ABV UP PARAM F/U: HCPCS | Performed by: INTERNAL MEDICINE

## 2022-07-12 PROCEDURE — G8427 DOCREV CUR MEDS BY ELIG CLIN: HCPCS | Performed by: INTERNAL MEDICINE

## 2022-07-12 PROCEDURE — 1123F ACP DISCUSS/DSCN MKR DOCD: CPT | Performed by: INTERNAL MEDICINE

## 2022-07-12 PROCEDURE — 3017F COLORECTAL CA SCREEN DOC REV: CPT | Performed by: INTERNAL MEDICINE

## 2022-07-12 PROCEDURE — 99214 OFFICE O/P EST MOD 30 MIN: CPT | Performed by: INTERNAL MEDICINE

## 2022-07-12 RX ORDER — FLUTICASONE FUROATE, UMECLIDINIUM BROMIDE AND VILANTEROL TRIFENATATE 100; 62.5; 25 UG/1; UG/1; UG/1
1 POWDER RESPIRATORY (INHALATION) DAILY
Qty: 1 EACH | Refills: 11 | Status: SHIPPED | OUTPATIENT
Start: 2022-07-12

## 2022-07-12 NOTE — PROGRESS NOTES
Pulmonary Progress           REASON FOR CONSULTATION:  Chief Complaint   Patient presents with    Asthma    Follow-up        Consult at request of Diane Murry MD     PCP: Diane Murry MD        Assessment and Plan:   Diagnosis Orders   1. Moderate persistent asthma without complication  fluticasone-umeclidin-vilant (TRELEGY ELLIPTA) 100-62.5-25 MCG/INH AEPB   2. Simple chronic bronchitis (HCC)  montelukast (SINGULAIR) 10 MG tablet       Plan:  Having issue affording maintains inhalers, will order Trelegy and check if he could get help with cost.   Continue Duoneb as needed. Intranasal steroid and antihistamine. Strict antireflux precautions and PPI. Omeprazole increased to BID x one month. Follow up in 3 months         HISTORY OF PRESENT ILLNESS: Adina Rodriguez is very pleasant 77y.o. year old gentleman with medical history stated below significant for former smoker quit in 2020, severe persistent asthma with recent exacerbation, treated with steroid and anti allergic meds. He has a pet dog. No mold in his house. 7/13/22:  Here for follow up   Breathing is better  Has not been using maintains inhalers due to cost.   Still with some wheezing   C/o active GERD symptoms       Past Medical History:   Diagnosis Date    GERD (gastroesophageal reflux disease)     Hypercholesteremia     Hyperlipidemia LDL goal <130 6/16/2011    Hypertension     Tobacco abuse     Vitamin D deficiency        History reviewed. No pertinent surgical history. family history includes Cancer in his mother; Heart Disease in his sister; Heart Failure in his brother and father; Heart Surgery in his sister; Hypertension in his father and sister; Lung Cancer (age of onset: 61) in his brother. SOCIAL HISTORY:   reports that he quit smoking about 2 years ago. His smoking use included cigarettes.  He started smoking about 58 years ago. He has a 20.00 pack-year smoking history. He has never used smokeless tobacco.      ALLERGIES:  Patient is allergic to atorvastatin and chantix [varenicline tartrate]. REVIEW OF SYSTEMS:  Constitutional: Negative for fever, no wt loss, no night sweats   HENT: Negative for sore throat, difficulty swallowing,   Eyes: Negative for redness, no discharge   Respiratory: Cough, wheezing, shortness of breath g   Cardiovascular: Negative for chest pain, no palpitations   Gastrointestinal: Negative for vomiting, diarrhea   Genitourinary: Negative for hematuria, no dysuria    Musculoskeletal: Negative for arthralgias, no joint swelling   Skin: Negative for rash  LE: no edema   Neurological: Negative for syncope, no tremor, no focal weakness or dysarthria   Hematological: Negative for adenopathy, or bleeding   Psychiatric/Behavorial: Negative for anxiety,    Objective:   PHYSICAL EXAM:  Blood pressure 108/70, pulse 70, temperature 98.4 °F (36.9 °C), temperature source Infrared, resp. rate 16, height 5' 9\" (1.753 m), weight 182 lb (82.6 kg), SpO2 95 %.'  Gen: No acute distress  Eyes: PERRL. No sclera icterus. No conjunctival injection. ENT: No discharge. Pharynx clear. External appearance of ears and nose normal.  Neck: Trachea midline. No obvious mass. Resp: good bilateral air entry with scattered wheezing   CV: Regular rate. Regular rhythm. No murmur or rub. No edema. GI: Non-tender. Non-distended. No hernia. Skin: Warm, dry, normal texture and turgor. No nodule on exposed extremities. Lymph: No cervical LAD. M/S: No cyanosis. No clubbing. No joint deformity. LE:  no edema   Neuro: no tremor, no focal deficit, awake and alert   Psych: intact judgement and insight.     Current Outpatient Medications   Medication Sig Dispense Refill    montelukast (SINGULAIR) 10 MG tablet TAKE ONE TABLET BY MOUTH DAILY 90 tablet 5    fluticasone-umeclidin-vilant (TRELEGY ELLIPTA) 100-62.5-25 MCG/INH AEPB Inhale 1 puff into the lungs daily 1 each 11    azelastine (ASTELIN) 0.1 % nasal spray 2 sprays by Nasal route 2 times daily Use in each nostril as directed 30 mL 3    fexofenadine (ALLEGRA) 180 MG tablet Take 1 tablet by mouth daily 30 tablet 3    ipratropium-albuterol (DUONEB) 0.5-2.5 (3) MG/3ML SOLN nebulizer solution Inhale 3 mLs into the lungs every 4 hours 360 mL 11    omeprazole (PRILOSEC) 40 MG delayed release capsule Take 1 capsule by mouth daily 90 capsule 3    losartan-hydroCHLOROthiazide (HYZAAR) 50-12.5 MG per tablet Take 1 tablet by mouth daily REPLACING LISINOPRIL 30 tablet 5    omeprazole (PRILOSEC) 20 MG delayed release capsule TAKE ONE CAPSULE BY MOUTH DAILY 90 capsule 3    albuterol sulfate HFA (VENTOLIN HFA) 108 (90 Base) MCG/ACT inhaler Inhale 2 puffs into the lungs 4 times daily as needed for Wheezing 18 g 5    fluticasone (FLONASE) 50 MCG/ACT nasal spray 2 sprays by Nasal route daily 1 each 1    vitamin D (CHOLECALCIFEROL) 250 MCG (68695 UT) CAPS capsule Take 1 capsule by mouth daily 90 capsule 3    fluticasone (FLONASE) 50 MCG/ACT nasal spray 2 sprays by Nasal route 2 times daily 16 g 5    budesonide-formoterol (SYMBICORT) 80-4.5 MCG/ACT AERO Inhale 2 puffs into the lungs 2 times daily 10.2 g 3     No current facility-administered medications for this visit.        Data Reviewed:   CBC and Renal reviewed  Last CBC  Lab Results   Component Value Date/Time    WBC 6.5 06/17/2022 12:27 PM    RBC 4.47 06/17/2022 12:27 PM    HGB 14.1 06/17/2022 12:27 PM    MCV 89.8 06/17/2022 12:27 PM     06/17/2022 12:27 PM     Last Renal  Lab Results   Component Value Date/Time     06/17/2022 12:27 PM    K 3.6 06/17/2022 12:27 PM     06/17/2022 12:27 PM    CO2 23 06/17/2022 12:27 PM    CO2 27 10/08/2021 10:50 AM    CO2 26 09/10/2020 12:57 PM    BUN 18 06/17/2022 12:27 PM    CREATININE 1.0 06/17/2022 12:27 PM    GLUCOSE 117 06/17/2022 12:27 PM    GLUCOSE 100 06/16/2011 08:35 AM    CALCIUM 9.2 06/17/2022 12:27 PM       Last ABG  POC Blood Gas: No results found for: POCPH, POCPCO2, POCPO2, POCHCO3, NBEA, HDJR2FQB  No results for input(s): PH, PCO2, PO2, HCO3, BE, O2SAT in the last 72 hours. Radiology Review:  Pertinent images / reports were reviewed as a part of this visit. CT Chest w/ contrast: No results found for this or any previous visit. CT Chest w/o contrast: No results found for this or any previous visit. CTPA: No results found for this or any previous visit. CXR PA/LAT: Results for orders placed during the hospital encounter of 06/17/22    XR CHEST (2 VW)    Narrative  EXAMINATION:  TWO XRAY VIEWS OF THE CHEST    6/17/2022 12:38 pm    COMPARISON:  April 21, 2022    HISTORY:  ORDERING SYSTEM PROVIDED HISTORY: SOB  TECHNOLOGIST PROVIDED HISTORY:  Reason for exam:->SOB  Reason for Exam: SOB    FINDINGS:  Redemonstration of hyperinflation with COPD. No evidence of focal  abnormality. The heart, mediastinum and pleural surfaces appear unremarkable. Impression  COPD. No acute lung disease. The heart, mediastinum and pleural surfaces  appear unremarkable. Pulmonary function testing  PFT with mild airflow obstruction and statistically significant postbronchodilator response        This note was transcribed using [id number] [email or web address]. Please disregard any translational errors.     Pantera Ramirez MD  [facility]

## 2022-07-13 RX ORDER — MONTELUKAST SODIUM 10 MG/1
TABLET ORAL
Qty: 90 TABLET | Refills: 5 | Status: SHIPPED | OUTPATIENT
Start: 2022-07-13

## 2022-07-20 NOTE — ED TRIAGE NOTES
SOB and cough for months. Unable to sleep at night, loss of appetitive.  Feels like food get stuck in throat
no

## 2022-08-23 ENCOUNTER — OFFICE VISIT (OUTPATIENT)
Dept: ENT CLINIC | Age: 67
End: 2022-08-23
Payer: MEDICARE

## 2022-08-23 VITALS
BODY MASS INDEX: 25.18 KG/M2 | WEIGHT: 170 LBS | DIASTOLIC BLOOD PRESSURE: 78 MMHG | HEIGHT: 69 IN | HEART RATE: 61 BPM | SYSTOLIC BLOOD PRESSURE: 127 MMHG

## 2022-08-23 DIAGNOSIS — R05.3 CHRONIC COUGH: Primary | ICD-10-CM

## 2022-08-23 DIAGNOSIS — J31.0 CHRONIC RHINITIS: ICD-10-CM

## 2022-08-23 DIAGNOSIS — K21.9 LARYNGOPHARYNGEAL REFLUX (LPR): ICD-10-CM

## 2022-08-23 DIAGNOSIS — R09.89 CHRONIC THROAT CLEARING: ICD-10-CM

## 2022-08-23 DIAGNOSIS — R09.81 NASAL CONGESTION: ICD-10-CM

## 2022-08-23 DIAGNOSIS — J34.2 DEVIATED NASAL SEPTUM: ICD-10-CM

## 2022-08-23 DIAGNOSIS — J34.89 NASAL OBSTRUCTION: ICD-10-CM

## 2022-08-23 PROCEDURE — G8417 CALC BMI ABV UP PARAM F/U: HCPCS | Performed by: STUDENT IN AN ORGANIZED HEALTH CARE EDUCATION/TRAINING PROGRAM

## 2022-08-23 PROCEDURE — 31231 NASAL ENDOSCOPY DX: CPT | Performed by: STUDENT IN AN ORGANIZED HEALTH CARE EDUCATION/TRAINING PROGRAM

## 2022-08-23 PROCEDURE — 1123F ACP DISCUSS/DSCN MKR DOCD: CPT | Performed by: STUDENT IN AN ORGANIZED HEALTH CARE EDUCATION/TRAINING PROGRAM

## 2022-08-23 PROCEDURE — 3017F COLORECTAL CA SCREEN DOC REV: CPT | Performed by: STUDENT IN AN ORGANIZED HEALTH CARE EDUCATION/TRAINING PROGRAM

## 2022-08-23 PROCEDURE — G8427 DOCREV CUR MEDS BY ELIG CLIN: HCPCS | Performed by: STUDENT IN AN ORGANIZED HEALTH CARE EDUCATION/TRAINING PROGRAM

## 2022-08-23 PROCEDURE — 1036F TOBACCO NON-USER: CPT | Performed by: STUDENT IN AN ORGANIZED HEALTH CARE EDUCATION/TRAINING PROGRAM

## 2022-08-23 PROCEDURE — 99213 OFFICE O/P EST LOW 20 MIN: CPT | Performed by: STUDENT IN AN ORGANIZED HEALTH CARE EDUCATION/TRAINING PROGRAM

## 2022-08-23 NOTE — PROGRESS NOTES
[address] Johns (:  1955) is a 77 y.o. male, here for evaluation of the following chief complaint(s):  Follow-up (Sinus doing better )      ASSESSMENT/PLAN:  1. Chronic cough  2. Chronic rhinitis  3. Chronic throat clearing  4. Laryngopharyngeal reflux (LPR)  5. Nasal congestion  6. Nasal obstruction  7. Deviated nasal septum      -Chronic cough improved, will observe  -Globus sensation improved with PPI  -Continue azelastine and fluticasone  -Continue saline irrigations if cannot tolerate use saline mist.     This is a very pleasant 77 y.o. male here today for evaluation of the the above-noted complaints. Medical Decision Making: The following items were considered in medical decision making:  Independent review of images  Review / order clinical lab tests  Review / order radiology tests  Decision to obtain old records  Review and summation of old records as accessed through [facility] if applicable    SUBJECTIVE/OBJECTIVE:  EBONY Daniels is here today for evaluation of issues related to chronic cough, chronic nasal drainage, chronic nasal obstruction and hearing loss. Patient is accompanied by was his wife who provides most of the history. Patient states that over the last several months he has had increasing shortness of breath. He has had a work-up for this including pulmonary function test.  He is soon set to meet with a pulmonologist.  Patient also states that he has nasal drainage which drains down the back of his throat. It is clear and white. He also feels like phlegm gets stuck at the level of his voicebox. He denies voice changes. He denies throat he does have frequent nasal congestion with nasal blockage. I will alternate from side to side. He denies facial pain or pressure. Denies high fever. Patient also has issues related to hearing loss. He has a history of loud noise exposure. exacerbation, resolution or failure of early diagnosis of recurrent cancer. The procedure report is present in the body of the chart. Verbal consent was received. After topical anesthesia and decongestion had been obtained using aerosolized 1% lidocaine and oxymetazoline, a 45 degree rigid endoscope was placed into both nares with the patient in a sitting position. The following was observed:      Septum: intact and deviated to the left with a large posterior septal spur and prominent anterior maxillary crest  Other:   -The inferior and middle turbinates were examined. The middle meatus, and sphenoethmoid recess was examined bilaterally.    -Clear secretions throughout. The scope was advanced and the upper aerodigestive tract was visualized. There is no evidence of vocal cord lesions or lesions of the nasopharynx, oropharynx, larynx or hypopharynx.   -There were no complications. Tolerated well without complication. I attest that I was present for and did the entire procedure myself. This note was generated completely or in part utilizing Dragon dictation speech recognition software. Occasionally, words are mistranscribed and despite editing, the text may contain inaccuracies due to incorrect word recognition. If further clarification is needed please contact the office at [phone]. An electronic signature was used to authenticate this note.     --Shiv Weiss MD

## 2022-09-08 ENCOUNTER — TELEPHONE (OUTPATIENT)
Dept: FAMILY MEDICINE CLINIC | Age: 67
End: 2022-09-08

## 2022-09-08 RX ORDER — LISINOPRIL AND HYDROCHLOROTHIAZIDE 25; 20 MG/1; MG/1
TABLET ORAL
Qty: 90 TABLET | Refills: 0 | Status: SHIPPED | OUTPATIENT
Start: 2022-09-08

## 2022-09-08 NOTE — TELEPHONE ENCOUNTER
I called him. Don't see that he is currently taking the Lisinopril-HCTZ  It shows losartan-HCTZ. He said he is not sure whay you changed him from the Lisinopril  to Losartan. But there was no difference, so he went back to the [facility]. He was last seen here on 6/20/22. Can't find where you wanted him back. He doesn't have an appt  at this time. Said he would call for one later this year. Did you want him to take the Lisinopril or Losartan?

## 2022-09-08 NOTE — TELEPHONE ENCOUNTER
Changed to see if lisinopril causing the cough. Sent in refill of lisinopril. Do NOT take both.   Please schedule check-up in 3 months with Dr. Denise Mishra or her NP, Valora Bumpers

## 2022-09-08 NOTE — TELEPHONE ENCOUNTER
Informed him of this. He said there was no difference in the cough. And he is only taking 1 on those meds, not both. He will call back to make appt for Dec. Not sure when he can come in.

## 2022-11-22 ENCOUNTER — TELEPHONE (OUTPATIENT)
Dept: FAMILY MEDICINE CLINIC | Age: 67
End: 2022-11-22

## 2022-11-22 NOTE — TELEPHONE ENCOUNTER
----- Message from Kierra Brewer sent at 11/22/2022  8:34 AM EST -----  Subject: Appointment Request    Reason for Call: Established Patient Appointment needed: Semi-Routine Skin   Problem    QUESTIONS    Reason for appointment request? Requested Provider unavailable - Muriel Hudson     Additional Information for Provider? Patient is having issuing in the anal   area. He is itchy and area seems loose when he clean the area such as   tool. Patient had cashews. Stool loose not firm.   ---------------------------------------------------------------------------  --------------  [facility] INFO  [phone]; OK to leave message on voicemail  ---------------------------------------------------------------------------  --------------  SCRIPT ANSWERS  COVID Screen: Jasmina Kaplan

## 2022-11-25 ENCOUNTER — OFFICE VISIT (OUTPATIENT)
Dept: FAMILY MEDICINE CLINIC | Age: 67
End: 2022-11-25
Payer: MEDICARE

## 2022-11-25 VITALS
SYSTOLIC BLOOD PRESSURE: 114 MMHG | HEART RATE: 52 BPM | HEIGHT: 69 IN | BODY MASS INDEX: 29.03 KG/M2 | TEMPERATURE: 98.5 F | RESPIRATION RATE: 14 BRPM | WEIGHT: 196 LBS | DIASTOLIC BLOOD PRESSURE: 68 MMHG | OXYGEN SATURATION: 96 %

## 2022-11-25 DIAGNOSIS — L29.0 PERIANAL PRURITUS: Primary | ICD-10-CM

## 2022-11-25 PROCEDURE — 1123F ACP DISCUSS/DSCN MKR DOCD: CPT

## 2022-11-25 PROCEDURE — 3074F SYST BP LT 130 MM HG: CPT

## 2022-11-25 PROCEDURE — G8417 CALC BMI ABV UP PARAM F/U: HCPCS

## 2022-11-25 PROCEDURE — 3017F COLORECTAL CA SCREEN DOC REV: CPT

## 2022-11-25 PROCEDURE — 3078F DIAST BP <80 MM HG: CPT

## 2022-11-25 PROCEDURE — 99213 OFFICE O/P EST LOW 20 MIN: CPT

## 2022-11-25 PROCEDURE — G8484 FLU IMMUNIZE NO ADMIN: HCPCS

## 2022-11-25 PROCEDURE — 1036F TOBACCO NON-USER: CPT

## 2022-11-25 PROCEDURE — G8427 DOCREV CUR MEDS BY ELIG CLIN: HCPCS

## 2022-11-25 RX ORDER — DIAPER,BRIEF,INFANT-TODD,DISP
EACH MISCELLANEOUS
Qty: 30 G | Refills: 1 | Status: SHIPPED | OUTPATIENT
Start: 2022-11-25 | End: 2022-12-02

## 2022-11-25 ASSESSMENT — ENCOUNTER SYMPTOMS
ABDOMINAL PAIN: 0
SHORTNESS OF BREATH: 0

## 2022-11-25 NOTE — PROGRESS NOTES
11/25/2022    This is a 79 y.o. male   Chief Complaint   Patient presents with    Anal Itching     Started 4 weeks ago. Worse at night. No signs of blood. No history of hemorrhoids. Amy BECK  Perianal itching started about 4 weeks ago  Ate about 1lb cashews right before this started  Itches worse at night. No blood in stool, no hemorrhoids, no abdominal pain  Has been making sure to keep perianal area clean and dry  Tried pinworm medication- no relief  Has used hydrocortisone once or twice without continued relief. Patient Active Problem List   Diagnosis    Vitamin D deficiency    Hyperlipidemia LDL goal <130    GERD (gastroesophageal reflux disease)    Essential hypertension    Insomnia    Snoring    Wheezing    ED (erectile dysfunction)    Low HDL (under 40)    Allergic rhinitis    Pulmonary nodule less than 1 cm in diameter with low risk for malignant neoplasm, plan repeat LDCT Nov 2022          Current Outpatient Medications   Medication Sig Dispense Refill    hydrocortisone (ALA-CARMITA) 1 % cream Apply topically 2 times daily.  30 g 1    lisinopril-hydroCHLOROthiazide (PRINZIDE;ZESTORETIC) 20-25 MG per tablet TAKE ONE TABLET BY MOUTH DAILY 90 tablet 0    montelukast (SINGULAIR) 10 MG tablet TAKE ONE TABLET BY MOUTH DAILY 90 tablet 5    azelastine (ASTELIN) 0.1 % nasal spray 2 sprays by Nasal route 2 times daily Use in each nostril as directed 30 mL 3    fexofenadine (ALLEGRA) 180 MG tablet Take 1 tablet by mouth daily 30 tablet 3    fluticasone (FLONASE) 50 MCG/ACT nasal spray 2 sprays by Nasal route 2 times daily 16 g 5    ipratropium-albuterol (DUONEB) 0.5-2.5 (3) MG/3ML SOLN nebulizer solution Inhale 3 mLs into the lungs every 4 hours 360 mL 11    omeprazole (PRILOSEC) 40 MG delayed release capsule Take 1 capsule by mouth daily 90 capsule 3    budesonide-formoterol (SYMBICORT) 80-4.5 MCG/ACT AERO Inhale 2 puffs into the lungs 2 times daily 10.2 g 3    albuterol sulfate HFA (VENTOLIN HFA) 108 (90 Base) Rectum: Normal. No mass, tenderness, anal fissure, external hemorrhoid or internal hemorrhoid. Normal anal tone. Musculoskeletal:         General: Normal range of motion. Right lower leg: No edema. Left lower leg: No edema. Skin:     General: Skin is warm and dry. Neurological:      Mental Status: He is oriented to person, place, and time. Psychiatric:         Behavior: Behavior normal.         Thought Content: Thought content normal.         Judgment: Judgment normal.       Assessmentand Plan  Ramonita Schwartz was seen today for anal itching. Diagnoses and all orders for this visit:    Perianal pruritus  -     hydrocortisone (ALA-CARMITA) 1 % cream; Apply topically 2 times daily. Provided re-assurance  Advised to keep area clean and dry  Apply hydrocortisone cream twice daily consistently for up to 2 weeks  Can use zinc oxide barrier as well  Dermoplast to help alleviate symptoms    Return if symptoms worsen or fail to improve.

## 2022-12-21 RX ORDER — LISINOPRIL AND HYDROCHLOROTHIAZIDE 25; 20 MG/1; MG/1
TABLET ORAL
Qty: 90 TABLET | Refills: 1 | Status: SHIPPED | OUTPATIENT
Start: 2022-12-21

## 2022-12-30 ENCOUNTER — TELEPHONE (OUTPATIENT)
Dept: CASE MANAGEMENT | Age: 67
End: 2022-12-30

## 2023-01-29 ENCOUNTER — TELEPHONE (OUTPATIENT)
Dept: CASE MANAGEMENT | Age: 68
End: 2023-01-29

## 2023-02-22 SDOH — HEALTH STABILITY: PHYSICAL HEALTH: ON AVERAGE, HOW MANY DAYS PER WEEK DO YOU ENGAGE IN MODERATE TO STRENUOUS EXERCISE (LIKE A BRISK WALK)?: 0 DAYS

## 2023-02-22 SDOH — HEALTH STABILITY: PHYSICAL HEALTH: ON AVERAGE, HOW MANY MINUTES DO YOU ENGAGE IN EXERCISE AT THIS LEVEL?: 0 MIN

## 2023-02-22 ASSESSMENT — PATIENT HEALTH QUESTIONNAIRE - PHQ9
1. LITTLE INTEREST OR PLEASURE IN DOING THINGS: 0
2. FEELING DOWN, DEPRESSED OR HOPELESS: 0
SUM OF ALL RESPONSES TO PHQ QUESTIONS 1-9: 0
SUM OF ALL RESPONSES TO PHQ9 QUESTIONS 1 & 2: 0
SUM OF ALL RESPONSES TO PHQ QUESTIONS 1-9: 0

## 2023-02-22 ASSESSMENT — LIFESTYLE VARIABLES
HOW MANY STANDARD DRINKS CONTAINING ALCOHOL DO YOU HAVE ON A TYPICAL DAY: 1 OR 2
HOW OFTEN DO YOU HAVE A DRINK CONTAINING ALCOHOL: 3
HOW OFTEN DO YOU HAVE A DRINK CONTAINING ALCOHOL: 2-4 TIMES A MONTH
HOW OFTEN DO YOU HAVE SIX OR MORE DRINKS ON ONE OCCASION: 1
HOW MANY STANDARD DRINKS CONTAINING ALCOHOL DO YOU HAVE ON A TYPICAL DAY: 1

## 2023-02-26 SDOH — ECONOMIC STABILITY: HOUSING INSECURITY
IN THE LAST 12 MONTHS, WAS THERE A TIME WHEN YOU DID NOT HAVE A STEADY PLACE TO SLEEP OR SLEPT IN A SHELTER (INCLUDING NOW)?: NO

## 2023-02-26 SDOH — ECONOMIC STABILITY: FOOD INSECURITY: WITHIN THE PAST 12 MONTHS, THE FOOD YOU BOUGHT JUST DIDN'T LAST AND YOU DIDN'T HAVE MONEY TO GET MORE.: NEVER TRUE

## 2023-02-26 SDOH — ECONOMIC STABILITY: FOOD INSECURITY: WITHIN THE PAST 12 MONTHS, YOU WORRIED THAT YOUR FOOD WOULD RUN OUT BEFORE YOU GOT MONEY TO BUY MORE.: NEVER TRUE

## 2023-02-26 SDOH — ECONOMIC STABILITY: INCOME INSECURITY: HOW HARD IS IT FOR YOU TO PAY FOR THE VERY BASICS LIKE FOOD, HOUSING, MEDICAL CARE, AND HEATING?: NOT VERY HARD

## 2023-02-26 SDOH — ECONOMIC STABILITY: TRANSPORTATION INSECURITY
IN THE PAST 12 MONTHS, HAS LACK OF TRANSPORTATION KEPT YOU FROM MEETINGS, WORK, OR FROM GETTING THINGS NEEDED FOR DAILY LIVING?: NO

## 2023-02-27 ENCOUNTER — OFFICE VISIT (OUTPATIENT)
Dept: FAMILY MEDICINE CLINIC | Age: 68
End: 2023-02-27
Payer: MEDICARE

## 2023-02-27 VITALS
DIASTOLIC BLOOD PRESSURE: 64 MMHG | BODY MASS INDEX: 28.88 KG/M2 | SYSTOLIC BLOOD PRESSURE: 112 MMHG | WEIGHT: 195 LBS | HEIGHT: 69 IN | HEART RATE: 62 BPM | OXYGEN SATURATION: 98 %

## 2023-02-27 DIAGNOSIS — R73.9 HYPERGLYCEMIA: ICD-10-CM

## 2023-02-27 DIAGNOSIS — I10 ESSENTIAL HYPERTENSION: ICD-10-CM

## 2023-02-27 DIAGNOSIS — J45.40 MODERATE PERSISTENT ASTHMA WITHOUT COMPLICATION: ICD-10-CM

## 2023-02-27 DIAGNOSIS — E55.9 VITAMIN D DEFICIENCY: ICD-10-CM

## 2023-02-27 DIAGNOSIS — Z87.891 FORMER SMOKER: ICD-10-CM

## 2023-02-27 DIAGNOSIS — J41.0 SIMPLE CHRONIC BRONCHITIS (HCC): ICD-10-CM

## 2023-02-27 DIAGNOSIS — Z13.6 SCREENING FOR AAA (ABDOMINAL AORTIC ANEURYSM): ICD-10-CM

## 2023-02-27 DIAGNOSIS — R91.1 PULMONARY NODULE LESS THAN 1 CM IN DIAMETER WITH LOW RISK FOR MALIGNANT NEOPLASM: ICD-10-CM

## 2023-02-27 DIAGNOSIS — Z00.00 INITIAL MEDICARE ANNUAL WELLNESS VISIT: Primary | ICD-10-CM

## 2023-02-27 DIAGNOSIS — Z91.89 PULMONARY NODULE LESS THAN 1 CM IN DIAMETER WITH LOW RISK FOR MALIGNANT NEOPLASM: ICD-10-CM

## 2023-02-27 PROBLEM — E78.6 LOW HDL (UNDER 40): Status: RESOLVED | Noted: 2019-11-15 | Resolved: 2023-02-27

## 2023-02-27 LAB
A/G RATIO: 1.9 (ref 1.1–2.2)
ALBUMIN SERPL-MCNC: 4.3 G/DL (ref 3.4–5)
ALP BLD-CCNC: 85 U/L (ref 40–129)
ALT SERPL-CCNC: 26 U/L (ref 10–40)
ANION GAP SERPL CALCULATED.3IONS-SCNC: 10 MMOL/L (ref 3–16)
AST SERPL-CCNC: 18 U/L (ref 15–37)
BILIRUB SERPL-MCNC: 0.6 MG/DL (ref 0–1)
BUN BLDV-MCNC: 21 MG/DL (ref 7–20)
CALCIUM SERPL-MCNC: 9.6 MG/DL (ref 8.3–10.6)
CHLORIDE BLD-SCNC: 101 MMOL/L (ref 99–110)
CHOLESTEROL, TOTAL: 180 MG/DL (ref 0–199)
CO2: 28 MMOL/L (ref 21–32)
CREAT SERPL-MCNC: 1.1 MG/DL (ref 0.8–1.3)
ESTIMATED AVERAGE GLUCOSE: 119.8 MG/DL
GFR SERPL CREATININE-BSD FRML MDRD: >60 ML/MIN/{1.73_M2}
GLUCOSE BLD-MCNC: 104 MG/DL (ref 70–99)
HBA1C MFR BLD: 5.8 %
HDLC SERPL-MCNC: 31 MG/DL (ref 40–60)
LDL CHOLESTEROL CALCULATED: 115 MG/DL
POTASSIUM SERPL-SCNC: 4.3 MMOL/L (ref 3.5–5.1)
SODIUM BLD-SCNC: 139 MMOL/L (ref 136–145)
TOTAL PROTEIN: 6.6 G/DL (ref 6.4–8.2)
TRIGL SERPL-MCNC: 170 MG/DL (ref 0–150)
VITAMIN D 25-HYDROXY: 98.6 NG/ML
VLDLC SERPL CALC-MCNC: 34 MG/DL

## 2023-02-27 PROCEDURE — G0439 PPPS, SUBSEQ VISIT: HCPCS | Performed by: FAMILY MEDICINE

## 2023-02-27 PROCEDURE — G8484 FLU IMMUNIZE NO ADMIN: HCPCS | Performed by: FAMILY MEDICINE

## 2023-02-27 PROCEDURE — 3074F SYST BP LT 130 MM HG: CPT | Performed by: FAMILY MEDICINE

## 2023-02-27 PROCEDURE — 1123F ACP DISCUSS/DSCN MKR DOCD: CPT | Performed by: FAMILY MEDICINE

## 2023-02-27 PROCEDURE — 3078F DIAST BP <80 MM HG: CPT | Performed by: FAMILY MEDICINE

## 2023-02-27 PROCEDURE — 3017F COLORECTAL CA SCREEN DOC REV: CPT | Performed by: FAMILY MEDICINE

## 2023-02-27 NOTE — PATIENT INSTRUCTIONS
INSTRUCTIONS  NEXT APPOINTMENT: Please schedule check-up in 6 months with Dr. Yara Salcedo or her NP, Jeanne Tran. Dr. Yara Salcedo and Jeanne Tran (Nurse Practitioner) are sharing their practices as a team.  This will allow increased access to care. Office visits may alternate between these providers while we continue to maintain high quality of care. PLEASE TAKE THIS FORM TO CHECK-OUT WINDOW TO SCHEDULE NEXT VISIT. PLEASE GET BLOODWORK DRAWN TODAY ON FIRST FLOOR in 170. Take orders with you. RESULTS- most blood tests back in couple days. We will call you if any problems. If bloodwork good, you will get letter in mail or notified thru [address] (if signed up) within 2 weeks. If you do not, please call office. Would get new bivalent COVID booster soon. Has better coverage for Omicron variant. Separate from other vaccines by at least 2 weeks. Please bring in copy of Living Will and Medical Power of [address] [place] [address] for our records. Get lung CT to recheck nodule sizes. Medicare part D patients:  Get Shingrix shingles vaccine at pharmacy (such as [facility] or [facility]). Need second dose in 2-6 months. Medicare starts covering it in 2023. Look around bathroom to make it safer to prevent falls. Please get eye exam to include evaluation for glaucoma. Try eye lubricant drops for dry eye if needed. Watch out for breathing to flare over summer. Eat less, move more! You can do it! Smaller potions. Patient Education           Learning About Being Active as an Older Adult  Why is being active important as you get older? Being active is one of the best things you can do for your health. And it's never too late to start. Being active--or getting active, if you aren't already--has definite benefits. It can:  Give you more energy,  Keep your mind sharp. Improve balance to reduce your risk of falls. Help you manage chronic illness with fewer medicines.   No matter how old you are, how fit you are, or what health problems you have, there is a form of activity that will work for you. And the more physical activity you can do, the better your overall health will be. What kinds of activity can help you stay healthy? Being more active will make your daily activities easier. Physical activity includes planned exercise and things you do in daily life. There are four types of activity:  Aerobic. Doing aerobic activity makes your heart and lungs strong. Includes walking, dancing, and gardening. Aim for at least 2½ hours spread throughout the week. It improves your energy and can help you sleep better. Muscle-strengthening. This type of activity can help maintain muscle and strengthen bones. Includes climbing stairs, using resistance bands, and lifting or carrying heavy loads. Aim for at least twice a week. It can help protect the knees and other joints. Stretching. Stretching gives you better range of motion in joints and muscles. Includes upper arm stretches, calf stretches, and gentle yoga. Aim for at least twice a week, preferably after your muscles are warmed up from other activities. It can help you function better in daily life. Balancing. This helps you stay coordinated and have good posture. Includes heel-to-toe walking, miguel ángel chi, and certain types of yoga. Aim for at least 3 days a week. It can reduce your risk of falling. Even if you have a hard time meeting the recommendations, it's better to be more active than less active. All activity done in each category counts toward your weekly total. You'd be surprised how daily things like carrying groceries, keeping up with grandchildren, and taking the stairs can add up. What keeps you from being active? If you've had a hard time being more active, you're not alone. Maybe you remember being able to do more. Or maybe you've never thought of yourself as being active. It's frustrating when you can't do the things you want. Being more active can help.  What's holding you back? Getting started. Have a goal, but break it into easy tasks. Small steps build into big accomplishments. Staying motivated. If you feel like skipping your activity, remember your goal. Maybe you want to move better and stay independent. Every activity gets you one step closer. Not feeling your best.  Start with 5 minutes of an activity you enjoy. Prove to yourself you can do it. As you get comfortable, increase your time. You may not be where you want to be. But you're in the process of getting there. Everyone starts somewhere. How can you find safe ways to stay active? Talk with your doctor about any physical challenges you're facing. Make a plan with your doctor if you have a health problem or aren't sure how to get started with activity. If you're already active, ask your doctor if there is anything you should change to stay safe as your body and health change. If you tend to feel dizzy after you take medicine, avoid activity at that time. Try being active before you take your medicine. This will reduce your risk of falls. If you plan to be active at home, make sure to clear your space before you get started. Remove things like TV cords, coffee tables, and throw rugs. It's safest to have plenty of space to move freely. The key to getting more active is to take it slow and steady. Try to improve only a little bit at a time. Pick just one area to improve on at first. And if an activity hurts, stop and talk to your doctor. Where can you learn more? Go to http://www.becerra.com/ and enter P600 to learn more about \"Learning About Being Active as an Older Adult. \"  Current as of: October 10, 2022               Content Version: 13.5  © 6846-5445 Healthwise, Incorporated. Care instructions adapted under license by [facility] ([facility]).  If you have questions about a medical condition or this instruction, always ask your healthcare professional. Radha Voss disclaims any warranty or liability for your use of this information. Learning About Vision Tests  What are vision tests? The four most common vision tests are visual acuity tests, refraction, visual field tests, and color vision tests. Visual acuity (sharpness) tests  These tests are used: To see if you need glasses or contact lenses. To monitor an eye problem. To check an eye injury. Visual acuity tests are done as part of routine exams. You may also have this test when you get your 's license or apply for some types of jobs. Visual field tests  These tests are used: To check for vision loss in any area of your range of vision. To screen for certain eye diseases. To look for nerve damage after a stroke, head injury, or other problem that could reduce blood flow to the brain. Refraction and color tests  A refraction test is done to find the right prescription for glasses and contact lenses. A color vision test is done to check for color blindness. Color vision is often tested as part of a routine exam. You may also have this test when you apply for a job where recognizing different colors is important, such as , electronics, or the [employer]. How are vision tests done? Visual acuity test   You cover one eye at a time. You read aloud from a wall chart across the room. You read aloud from a small card that you hold in your hand. Refraction   You look into a special device. The device puts lenses of different strengths in front of each eye to see how strong your glasses or contact lenses need to be. Visual field tests   Your doctor may have you look through special machines. Or your doctor may simply have you stare straight ahead while they move a finger into and out of your field of vision. Color vision test   You look at pieces of printed test patterns in various colors. You say what number or symbol you see. Your doctor may have you trace the number or symbol using a pointer.   How do these tests feel? There is very little chance of having a problem from this test. If dilating drops are used for a vision test, they may make the eyes sting and cause a medicine taste in the mouth. Follow-up care is a key part of your treatment and safety. Be sure to make and go to all appointments, and call your doctor if you are having problems. It's also a good idea to know your test results and keep a list of the medicines you take. Where can you learn more? Go to http://www.becerra.com/ and enter G551 to learn more about \"Learning About Vision Tests. \"  Current as of: October 12, 2022               Content Version: 13.5  © 2006-2022 [employer]. Care instructions adapted under license by [facility] ([facility]). If you have questions about a medical condition or this instruction, always ask your healthcare professional. [place] [address] any warranty or liability for your use of this information. Advance Directives: Care Instructions  Overview  An advance directive is a legal way to state your wishes at the end of your life. It tells your family and your doctor what to do if you can't say what you want. There are two main types of advance directives. You can change them any time your wishes change. Living will. This form tells your family and your doctor your wishes about life support and other treatment. The form is also called a declaration. Medical power of . This form lets you name a person to make treatment decisions for you when you can't speak for yourself. This person is called a health care agent (health care proxy, health care surrogate). The form is also called a durable power of  for health care. If you do not have an advance directive, decisions about your medical care may be made by a family member, or by a doctor or a  who doesn't know you.   It may help to think of an advance directive as a gift to the people who care for you. If you have one, they won't have to make tough decisions by themselves. For more information, including forms for your state, see the [address] website (www.caringinfo.org/planning/advance-directives/). Follow-up care is a key part of your treatment and safety. Be sure to make and go to all appointments, and call your doctor if you are having problems. It's also a good idea to know your test results and keep a list of the medicines you take. What should you include in an advance directive? Many states have a unique advance directive form. (It may ask you to address specific issues.) Or you might use a universal form that's approved by many states. If your form doesn't tell you what to address, it may be hard to know what to include in your advance directive. Use the questions below to help you get started. Who do you want to make decisions about your medical care if you are not able to? What life-support measures do you want if you have a serious illness that gets worse over time or can't be cured? What are you most afraid of that might happen? (Maybe you're afraid of having pain, losing your independence, or being kept alive by machines.)  Where would you prefer to die? (Your home? A hospital? A nursing home?)  Do you want to donate your organs when you die? Do you want certain Jew practices performed before you die? When should you call for help? Be sure to contact your doctor if you have any questions. Where can you learn more? Go to http://www.[employer].com/ and enter R264 to learn more about \"Advance Directives: Care Instructions. \"  Current as of: June 16, 2022               Content Version: 13.5  © 3376-4242 Healthwise, Incorporated. Care instructions adapted under license by [facility] ([facility]).  If you have questions about a medical condition or this instruction, always ask your healthcare professional. [address] any warranty or liability for your use of this information. A Healthy Heart: Care Instructions  Your Care Instructions     Coronary artery disease, also called heart disease, occurs when a substance called plaque builds up in the vessels that supply oxygen-rich blood to your heart muscle. This can narrow the blood vessels and reduce blood flow. A heart attack happens when blood flow is completely blocked. A high-fat diet, smoking, and other factors increase the risk of heart disease. Your doctor has found that you have a chance of having heart disease. You can do lots of things to keep your heart healthy. It may not be easy, but you can change your diet, exercise more, and quit smoking. These steps really work to lower your chance of heart disease. Follow-up care is a key part of your treatment and safety. Be sure to make and go to all appointments, and call your doctor if you are having problems. It's also a good idea to know your test results and keep a list of the medicines you take. How can you care for yourself at home? Diet    Use less salt when you cook and eat. This helps lower your blood pressure. Taste food before salting. Add only a little salt when you think you need it. With time, your taste buds will adjust to less salt. Eat fewer snack items, fast foods, canned soups, and other high-salt, high-fat, processed foods. Read food labels and try to avoid saturated and trans fats. They increase your risk of heart disease by raising cholesterol levels. Limit the amount of solid fat-butter, margarine, and shortening-you eat. Use olive, peanut, or canola oil when you cook. Bake, broil, and steam foods instead of frying them. Eat a variety of fruit and vegetables every day. Dark green, deep orange, red, or yellow fruits and vegetables are especially good for you. Examples include spinach, carrots, peaches, and berries. Foods high in fiber can reduce your cholesterol and provide important vitamins and minerals. High-fiber foods include whole-grain cereals and breads, oatmeal, beans, brown rice, citrus fruits, and apples. Eat lean proteins. Heart-healthy proteins include seafood, lean meats and poultry, eggs, beans, peas, nuts, seeds, and soy products. Limit drinks and foods with added sugar. These include candy, desserts, and soda pop. Lifestyle changes    If your doctor recommends it, get more exercise. Walking is a good choice. Bit by bit, increase the amount you walk every day. Try for at least 30 minutes on most days of the week. You also may want to swim, bike, or do other activities. Do not smoke. If you need help quitting, talk to your doctor about stop-smoking programs and medicines. These can increase your chances of quitting for good. Quitting smoking may be the most important step you can take to protect your heart. It is never too late to quit. Limit alcohol to 2 drinks a day for men and 1 drink a day for women. Too much alcohol can cause health problems. Manage other health problems such as diabetes, high blood pressure, and high cholesterol. If you think you may have a problem with alcohol or drug use, talk to your doctor. Medicines    Take your medicines exactly as prescribed. Call your doctor if you think you are having a problem with your medicine. If your doctor recommends aspirin, take the amount directed each day. Make sure you take aspirin and not another kind of pain reliever, such as acetaminophen (Tylenol). When should you call for help? Call 911 if you have symptoms of a heart attack. These may include:    Chest pain or pressure, or a strange feeling in the chest.     Sweating. Shortness of breath. Pain, pressure, or a strange feeling in the back, neck, jaw, or upper belly or in one or both shoulders or arms. Lightheadedness or sudden weakness. A fast or irregular heartbeat.    After you call 911, the  may tell you to chew 1 adult-strength or 2 to 4 low-dose aspirin. Wait for an ambulance. Do not try to drive yourself. Watch closely for changes in your health, and be sure to contact your doctor if you have any problems. Where can you learn more? Go to http://www.becerra.com/ and enter F075 to learn more about \"A Healthy Heart: Care Instructions. \"  Current as of: September 7, 2022               Content Version: 13.5  © 2006-2022 [employer]. Care instructions adapted under license by [facility] ([facility]). If you have questions about a medical condition or this instruction, always ask your healthcare professional. [place] [address] any warranty or liability for your use of this information. Personalized Preventive Plan for [facility] - 2/27/2023  Medicare offers a range of preventive health benefits. Some of the tests and screenings are paid in full while other may be subject to a deductible, co-insurance, and/or copay. Some of these benefits include a comprehensive review of your medical history including lifestyle, illnesses that may run in your family, and various assessments and screenings as appropriate. After reviewing your medical record and screening and assessments performed today your provider may have ordered immunizations, labs, imaging, and/or referrals for you. A list of these orders (if applicable) as well as your Preventive Care list are included within your After Visit Summary for your review. Other Preventive Recommendations:    A preventive eye exam performed by an eye specialist is recommended every 1-2 years to screen for glaucoma; cataracts, macular degeneration, and other eye disorders. A preventive dental visit is recommended every 6 months. Try to get at least 150 minutes of exercise per week or 10,000 steps per day on a pedometer . Order or download the FREE \"Exercise & Physical Activity: Your Everyday Guide\" from [employer].  Call 5-291.940.8557 or search [employer] on Aging online. You need 7502-1089 mg of calcium and 3217-0499 IU of vitamin D per day. It is possible to meet your calcium requirement with diet alone, but a vitamin D supplement is usually necessary to meet this goal.  When exposed to the sun, use a sunscreen that protects against both UVA and UVB radiation with an SPF of 30 or greater. Reapply every 2 to 3 hours or after sweating, drying off with a towel, or swimming. Always wear a seat belt when traveling in a car. Always wear a helmet when riding a bicycle or motorcycle. FALLS:  HOW TO LOWER YOUR RISK     Who is at high risk of falling? Anyone can fall, although the risk is higher in older people. This increased risk of falling may be the result of changes that come with aging, and certain medical conditions, such as arthritis, cataracts or hip problems. What can I do to lower my risk of falling? Most falls happen in the home. Consider the following tips to make your home safe: Make sure that you have good lighting in your home. A well lit home will help you avoid tripping over objects that are not easy to see. Put night lights in your bedroom, hallways, stairs and bathrooms. Rugs should be firmly fastened to the floor or have nonskid backing. Loose ends should be tacked down. Electrical cords should not be lying on the floor in walking areas. Put hand rails in your bathroom for bath, shower and toilet use. Have rails on both sides of your stairs for support. In the kitchen, make sure items are within easy reach. Don't store things too high or too low. Then you won't have to use a stepladder or a stool to reach them. It's also a good idea to avoid storing things too low, so you won't have to bend down to get them. Wear shoes with firm nonskid soles. Avoid wearing loose-fitting slippers that could cause you to trip. What else can I do? Take good care of your body.  Try to stay healthy by following these tips:  See your eye doctor once a year. Cataracts and other eye diseases that cause you not to see well, can lead to falls. Get regular physical activity to keep your bones and muscles strong. Take good care of your feet. If you have pain in your feet or if you have large, thick nails and corns, have your doctor look at your feet. Talk to your doctor about any side effects you may have from your medicines. Problems caused by side effects from medicine are a common cause of falls. The more medicines you take, the greater your risk of falling. Talk to your doctor if you have dizzy spells. If your doctor suggests that you use a cane or a walker to help you walk, be sure to use it. This will give you extra stability when walking and will help you avoid falls. Don't smoke. Limit alcohol to no more than 2 drinks per day. When you get out of bed in the morning or at night to use the bathroom, sit on the side of the bed for a few minutes before standing up. Your blood pressure takes some time to adjust when you sit up. It may be too low if you get up quickly. This can make you dizzy, and you might lose your balance and fall. Home Safety: How Well Does Your Home Meet Your Needs? Steps/Stairways/Walkways YesNo    Are they in good shape? Do they have a smooth, safe surface? Are there handrails on both sides of the stairway? How about light switches at the top and bottom of the stairs? Is there grasping space for both knuckles and fingers on railings? Are the stair treads deep enough for your whole foot? Would a ramp be feasible in any of these areas if it became necessary? Floor Surfaces YesNo    Is the surface safe? [place]? Any throw rugs or doormats that might slip underfoot? Is carpeting loose or torn? Are there changes in floor levels? If so, are they obvious or well marked? Do you have to step over any electric, telephone, or extension cords?    Driveway and [place]    Is there always space to park? Is it convenient to the entrance? Does the garage door open automatically? Windows & Doors [place]    Are windows and doors easy to open and close? Are locks sturdy and easy to operate? Do doorways accommodate a walker or wheelchair? Can you walk through the doorways easily? Is there space to maneuver while opening and closing doors? Does the front door have a view panel or peephole at the right height? Appliances/Kitchen/[facility]    Is the room arranged safely and conveniently? Do the oven and fridge open easily? Are stove controls clearly marked and easy to use? Is the counter the right height and depth? Can you work sitting down? Are cabinet doorknobs easy to use? Are faucets easy to use? Do you have a hand-held shower head? Are the items you use often on high shelves? Do you have a step stool with handles? Can you easily get in and out of the tub or shower? Do you have a bath or shower seat? Are there grab bars where needed? Is the hot water heater regulated to prevent scalding or burning? Lighting/Ventilation YesNo    Are there enough lights, and are they bright enough? Do you have night lights where needed? Is area well ventilated? Electrical Outlets/Switches/Alarms YesNo    Can you turn switches easily on and off? Are outlets properly grounded to prevent a shock? Are extension cords in good shape? Do you have smoke detectors in all key areas? Do you have an alarm system? Is the telephone readily available for emergencies? Does the telephone have volume control? Can you hear the doorbell ring all throughout the house?

## 2023-02-27 NOTE — PROGRESS NOTES
Medicare Annual Wellness Visit  Name: Emanuel Vu  YOB: 1955  Age: 79 y.o. Sex: male  MRN: [id number]     Date of Service:  2/27/2023    Chief Complaint:   Emanuel Vu is a 79 y.o. male who presents for Medicare Annual Wellness Visit and check-up for:  1. Initial Medicare annual wellness visit    2. Essential hypertension    3. Simple chronic bronchitis ([address]    4. Vitamin D deficiency    5. Hyperglycemia    6. Moderate persistent asthma without complication - not used any inhalers in 8 months. 7. Pulmonary nodule less than 1 cm in diameter with low risk for malignant neoplasm, plan repeat LDCT Nov 2022      HPI  Chief Complaint   Patient presents with    Medicare AWV     AWV   Complaints: none. Review of Systems - unremarable except as noted above  Takes OTC for sleep and works well    HISTORY:  Patient's medications, allergies, past medical, and social histories were reviewed and updated as appropriate.      CHART REVIEW  Health Maintenance   Topic Date Due    Diabetes screen  Never done    Shingles vaccine (2 of 3) 09/10/2015    AAA screen  11/11/2020    COVID-19 Vaccine (4 - Booster for Moderna series) 02/20/2022    Low dose CT lung screening  05/23/2023    Depression Screen  02/22/2024    Annual Wellness Visit (AWV)  02/28/2024    DTaP/Tdap/Td vaccine (4 - Td or Tdap) 07/16/2025    Lipids  10/08/2026    Colorectal Cancer Screen  11/12/2028    Flu vaccine  Completed    Pneumococcal 65+ years Vaccine  Completed    Hepatitis C screen  Completed    Hepatitis A vaccine  Aged Out    Hib vaccine  Aged Out    Meningococcal (ACWY) vaccine  Aged Out     The 10-year ASCVD risk score (Shavonne LR, et al., 2019) is: 17%    Values used to calculate the score:      Age: 79 years      Sex: Male      Is Non- : No      Diabetic: No      Tobacco smoker: No      Systolic Blood Pressure: 559 mmHg      Is BP treated: Yes      HDL Cholesterol: 29 mg/dL      Total Cholesterol: 182 mg/dL  Current Outpatient Medications   Medication Instructions    fexofenadine (ALLEGRA) 180 mg, Oral, DAILY    lisinopril-hydroCHLOROthiazide (PRINZIDE;ZESTORETIC) 20-25 MG per tablet TAKE ONE TABLET BY MOUTH DAILY    montelukast (SINGULAIR) 10 MG tablet TAKE ONE TABLET BY MOUTH DAILY    omeprazole (PRILOSEC) 40 mg, Oral, DAILY    vitamin D (CHOLECALCIFEROL) 10,000 Units, Oral, DAILY      Family History   Problem Relation Age of Onset    Cancer Mother         skin    Heart Failure Father     Hypertension Father     Lung Cancer Brother 61    Heart Failure Brother     Heart Surgery Sister     Hypertension Sister     Heart Disease Sister         bicuspid aortic valve     Social History     Tobacco Use    Smoking status: Former     Packs/day: 0.50     Years: 40.00     Pack years: 20.00     Types: Cigarettes     Start date: 1964     Quit date: 2020     Years since quittin.6    Smokeless tobacco: Never   Substance Use Topics    Alcohol use:  Yes     Alcohol/week: 0.0 standard drinks     Comment: occassional up to 5 at a time    Drug use: No      Immunization History   Administered Date(s) Administered    COVID-19, MODERNA BLUE border, Primary or Immunocompromised, (age 12y+), IM, 100 mcg/0.5mL 2021, 2021, 2021    INFLUENZA, INTRADERMAL, QUADRIVALENT, PRESERVATIVE FREE 2016, 2017    Influenza Virus Vaccine 10/01/2008, 10/29/2020    Influenza, FLUARIX, FLULAVAL, FLUZONE (age 10 mo+) AND AFLURIA, (age 1 y+), PF, 0.5mL 11/15/2019    Influenza, FLUZONE (age 72 y+), High Dose, 0.7mL 2022    Influenza, Intradermal, Preservative free 2013, 2013, 2016    Pneumococcal Polysaccharide ([id number]) 2016    Tdap (Boostrix, Adacel) 2004, 2008, 2015    Zoster Live (Zostavax) 2015     LAST LABS  Cholesterol, Total   Date Value Ref Range Status   10/08/2021 182 0 - 199 mg/dL Final     LDL Calculated   Date Value Ref Range Status   10/08/2021 129 (H) <100 mg/dL Final     HDL   Date Value Ref Range Status   10/08/2021 29 (L) 40 - 60 mg/dL Final   12/23/2011 31 (L) 40 - 60 mg/dl Final     Triglycerides   Date Value Ref Range Status   10/08/2021 121 0 - 150 mg/dL Final     Lab Results   Component Value Date     06/17/2022    K 3.6 06/17/2022    CREATININE 1.0 06/17/2022     Lab Results   Component Value Date    LABGLOM >60 06/17/2022    LABGLOM >60 10/08/2021    LABGLOM >60 09/10/2020    LABGLOM >60 04/24/2019    LABGLOM >60 04/17/2018     Lab Results   Component Value Date    WBC 6.5 06/17/2022    HGB 14.1 06/17/2022    HCT 40.1 (L) 06/17/2022    MCV 89.8 06/17/2022     06/17/2022     Lab Results   Component Value Date    ALT 20 10/08/2021    AST 15 10/08/2021    ALKPHOS 81 10/08/2021    BILITOT 0.7 10/08/2021     TSH (uIU/mL)   Date Value   01/06/2016 1.35     Lab Results   Component Value Date    GLUCOSE 117 (H) 06/17/2022   No results found for: LABA1C   CareTeam (Including outside providers/suppliers regularly involved in providing care):   Patient Care Team:  Clement Tucker MD as PCP - General (Family Medicine)  Clement Tucker MD as P[facility]  Silvana Schultz RN as Nurse Navigator    The following problems were reviewed today and where indicated follow up appointments were made and/or referrals ordered.     Positive Risk Factor Screenings with Interventions:          General Health and ACP:  General  In general, how would you say your health is?: Good  In the past 7 days, have you experienced any of the following: New or Increased Pain, New or Increased Fatigue, Loneliness, Social Isolation, Stress or Anger?: No  Do you get the social and emotional support that you need?: Yes  Do you have a Living Will?: Yes    Advance Directives       Power of  Living Will ACP-Advance Directive ACP-Power of     Not on File Not on File Not on File Not on File        General Health Risk Interventions:  No Living Will: ACP documents already completed- patient asked to provide copy to the office    Weight and Activity:  Physical Activity: Inactive    Days of Exercise per Week: 0 days    Minutes of Exercise per Session: 0 min     On average, how many days per week do you engage in moderate to strenuous exercise (like a brisk walk)?: 0 days  Have you lost any weight without trying in the past 3 months?: No  Body mass index: (!) 28.79  Health Habits/Nutrition Interventions:  Nutritional issues:  educational materials to promote weight loss provided    Vision Screen:  Do you have difficulty driving, watching TV, or doing any of your daily activities because of your eyesight?: No  Have you had an eye exam within the past year?: (!) No  No results found. Hearing/Vision Interventions:  Vision concerns:  Patient encouraged to make appointment with their eye specialist    Safety:  Do you have either shower bars, grab bars, non-slip mats or non-slip surfaces in your shower or bathtub?: (!) No  Interventions:  See AVS for additional education material     Current Health Maintenance Status  Recommendations for Preventive Services Due: see orders. Recommended screening schedule for the next 5-10 years is provided to the patient in written form: see Patient Instructions/AVS.    PHYSICAL EXAM:  VITALS:  /64 (Site: Left Upper Arm, Position: Sitting, Cuff Size: Large Adult)   Pulse 62   Ht 5' 9\" (1.753 m)   Wt 195 lb (88.5 kg)   SpO2 98%   BMI 28.80 kg/m²   BP Readings from Last 5 Encounters:   02/27/23 112/64   11/25/22 114/68   08/23/22 127/78   07/12/22 108/70   06/21/22 110/68     Wt Readings from Last 5 Encounters:   02/27/23 195 lb (88.5 kg)   11/25/22 196 lb (88.9 kg)   08/23/22 170 lb (77.1 kg)   07/12/22 182 lb (82.6 kg)   06/21/22 182 lb 6.4 oz (82.7 kg)   Body mass index is 28.8 kg/m².   GENERAL: well-developed, well-nourished, alert, no distress, calm   EYES: negative findings: lids and lashes normal and conjunctivae and sclerae normal  ENT: normal TM's and external ear canals both ears  External nose and ears appear normal  Pharynx: normal. Exudates: None  Lips, mucosa, and tongue normal  Hearing grossly normal.    NECK: No adenopathy, supple, symmetrical, trachea midline  Thyroid not enlarged, symmetric, no tenderness/mass/nodules  no cervical nodes, no supraclavicular nodes  LUNGS:  Breathing unlabored  clear to auscultation bilaterally and good air movement  CARDIOVASC: regular rate and rhythm, S1, S2 normal  LEGS:  Lower extremity edema: none    No carotid bruits  ABDOMEN: Soft, non-tender, no masses  No hepatosplenomegaly  No hernias noted. Exam limited by N/A  SKIN: warm and dry  No rashes or suspicious lesions  PSYCH:  Alert and oriented  Normal reasoning, insight good  Facial expressions full, mood appropriate  No memory disturbance noted  MUSCULOSKEL:  No significant finger or nail findings  Spine symmetric, no deformities, no kyphosis   GAIT: UP and Go test: <30 seconds with gait: normal.  Speed Normal.  No significant balance checks. No extra steps on turn around. Assistive device: none        Assessment and Plan:      Diagnosis Orders   1. Initial Medicare annual wellness visit        2. Essential hypertension  Comprehensive Metabolic Panel    Lipid Panel      3. Simple chronic bronchitis ([address]        4. Vitamin D deficiency  Vitamin D 25 Hydroxy      5. Hyperglycemia  Hemoglobin A1C      6. Moderate persistent asthma without complication        7. Pulmonary nodule less than 1 cm in diameter with low risk for malignant neoplasm, plan repeat LDCT Nov 2022  Low Dose Chest CT-Abnormal Lung Screen Follow up      8. Former smoker  [place] AAA      9. Screening for AAA (abdominal aortic aneurysm)  US SCREENING FOR AAA      Stable. Plan as above and below. Good control. Current treatment plan is effective, continue same.     FYI: While Medicare provides you with a FREE ANNUAL PREVENTIVE PHYSICAL, this visit does NOT include management of chronic medical problems or physical examination. Dr. Zach Paul usually does a combination visit if you have other medical problems so you don't have to come back for another visit. However, this means that there will be a co-pay. INSTRUCTIONS  NEXT APPOINTMENT: Please schedule check-up in 6 months with Dr. Zach Paul or her NP, Lamine aLrkin. Dr. Zach Paul and Lamine Larkin (Nurse Practitioner) are sharing their practices as a team.  This will allow increased access to care. Office visits may alternate between these providers while we continue to maintain high quality of care. PLEASE TAKE THIS FORM TO CHECK-OUT WINDOW TO SCHEDULE NEXT VISIT. PLEASE GET BLOODWORK DRAWN TODAY ON FIRST FLOOR in 170. Take orders with you. RESULTS- most blood tests back in couple days. We will call you if any problems. If bloodwork good, you will get letter in mail or notified thru [address] (if signed up) within 2 weeks. If you do not, please call office. Would get new bivalent COVID booster soon. Has better coverage for Omicron variant. Separate from other vaccines by at least 2 weeks. Please bring in copy of Living Will and [facility]. Get lung CT to recheck nodule sizes. Medicare part D patients:  Get Shingrix shingles vaccine at pharmacy (such as [facility] or "[facility]). Need second dose in 2-6 months. Medicare starts covering it in 2023. Look around bathroom to make it safer to prevent falls. Please get eye exam to include evaluation for glaucoma. Try eye lubricant drops for dry eye if needed. Watch out for breathing to flare over summer. Eat less, move more! You can do it! Smaller potions.

## 2023-05-03 RX ORDER — FEXOFENADINE HCL 180 MG/1
TABLET ORAL
Qty: 30 TABLET | Refills: 3 | Status: SHIPPED | OUTPATIENT
Start: 2023-05-03

## 2023-06-16 DIAGNOSIS — R06.2 WHEEZING: ICD-10-CM

## 2023-06-16 DIAGNOSIS — R06.02 SHORTNESS OF BREATH: ICD-10-CM

## 2023-06-16 DIAGNOSIS — R05.9 COUGH IN ADULT: ICD-10-CM

## 2023-06-16 RX ORDER — ALBUTEROL SULFATE 90 UG/1
AEROSOL, METERED RESPIRATORY (INHALATION)
Qty: 18 G | Refills: 5 | OUTPATIENT
Start: 2023-06-16

## 2023-06-22 RX ORDER — LISINOPRIL AND HYDROCHLOROTHIAZIDE 25; 20 MG/1; MG/1
1 TABLET ORAL DAILY
Qty: 90 TABLET | Refills: 1 | Status: SHIPPED | OUTPATIENT
Start: 2023-06-22

## 2023-07-18 RX ORDER — FLUTICASONE PROPIONATE 50 MCG
SPRAY, SUSPENSION (ML) NASAL
Qty: 2 EACH | Refills: 5 | Status: SHIPPED | OUTPATIENT
Start: 2023-07-18

## 2023-08-18 ENCOUNTER — HOSPITAL ENCOUNTER (OUTPATIENT)
Dept: ULTRASOUND IMAGING | Age: 68
Discharge: HOME | End: 2023-08-18
Payer: MEDICARE

## 2023-08-18 DIAGNOSIS — Z87.891 FORMER SMOKER: ICD-10-CM

## 2023-08-18 DIAGNOSIS — Z13.6 SCREENING FOR AAA (ABDOMINAL AORTIC ANEURYSM): ICD-10-CM

## 2023-08-18 PROCEDURE — 76706 US ABDL AORTA SCREEN AAA: CPT

## 2023-08-26 ENCOUNTER — HOSPITAL ENCOUNTER (OUTPATIENT)
Dept: CT IMAGING | Age: 68
Discharge: HOME | End: 2023-08-26
Attending: FAMILY MEDICINE
Payer: MEDICARE

## 2023-08-26 DIAGNOSIS — R91.1 PULMONARY NODULE LESS THAN 1 CM IN DIAMETER WITH LOW RISK FOR MALIGNANT NEOPLASM: ICD-10-CM

## 2023-08-26 DIAGNOSIS — Z91.89 PULMONARY NODULE LESS THAN 1 CM IN DIAMETER WITH LOW RISK FOR MALIGNANT NEOPLASM: ICD-10-CM

## 2023-08-26 PROCEDURE — 71250 CT THORAX DX C-: CPT

## 2023-09-08 ENCOUNTER — OFFICE VISIT (OUTPATIENT)
Dept: FAMILY MEDICINE CLINIC | Age: 68
End: 2023-09-08

## 2023-09-08 VITALS
OXYGEN SATURATION: 98 % | HEIGHT: 69 IN | HEART RATE: 60 BPM | WEIGHT: 190 LBS | DIASTOLIC BLOOD PRESSURE: 70 MMHG | BODY MASS INDEX: 28.14 KG/M2 | SYSTOLIC BLOOD PRESSURE: 110 MMHG

## 2023-09-08 DIAGNOSIS — J45.40 MODERATE PERSISTENT ASTHMA WITHOUT COMPLICATION: ICD-10-CM

## 2023-09-08 DIAGNOSIS — L71.8 PHYMATOUS ROSACEA: ICD-10-CM

## 2023-09-08 DIAGNOSIS — Z23 NEEDS FLU SHOT: ICD-10-CM

## 2023-09-08 DIAGNOSIS — E78.5 HYPERLIPIDEMIA LDL GOAL <130: ICD-10-CM

## 2023-09-08 DIAGNOSIS — I10 ESSENTIAL HYPERTENSION: Primary | ICD-10-CM

## 2023-09-08 DIAGNOSIS — J41.0 SIMPLE CHRONIC BRONCHITIS (HCC): ICD-10-CM

## 2023-09-08 RX ORDER — IVERMECTIN 10 MG/G
CREAM TOPICAL
Qty: 45 G | Refills: 5 | Status: SHIPPED | OUTPATIENT
Start: 2023-09-08

## 2023-09-08 RX ORDER — ALBUTEROL SULFATE 90 UG/1
2 AEROSOL, METERED RESPIRATORY (INHALATION) 4 TIMES DAILY PRN
Qty: 18 G | Refills: 5 | Status: SHIPPED | OUTPATIENT
Start: 2023-09-08

## 2023-09-08 RX ORDER — ALBUTEROL SULFATE 90 UG/1
2 AEROSOL, METERED RESPIRATORY (INHALATION) EVERY 6 HOURS PRN
COMMUNITY

## 2023-09-08 NOTE — PROGRESS NOTES
CHRONIC CONDITION FOLLOW-UP     Assessment and Plan:      Diagnosis Orders   1. Essential hypertension        2. Simple chronic bronchitis (HCC)  albuterol sulfate HFA (VENTOLIN HFA) 108 (90 Base) MCG/ACT inhaler      3. Hyperlipidemia LDL goal <130        4. Moderate persistent asthma without complication  albuterol sulfate HFA (VENTOLIN HFA) 108 (90 Base) MCG/ACT inhaler      5. Phymatous rosacea  Ivermectin 1 % CREA      6. Needs flu shot  Influenza, FLUAD, (age 72 y+), IM, PF, 0.5 mL      Stable     Continue current Tx plan. Any changes marked below. INSTRUCTIONS  NEXT APPOINTMENT:  Please schedule fasting annual physical (30 minutes) in 6 months. OK to have water, black coffee and medications (except for diabetes medicines) with Dr. Donna Han or her NP, Anastasiia Dominguez. PLEASE TAKE THIS FORM TO CHECK-OUT WINDOW TO SCHEDULE NEXT VISIT. Start ivermectin cream for rosacea. Let me know if issue getting it and can switch to metronidazole  See dermatology. Subjective:      Chief Complaint   Patient presents with    Hypertension     6 mo f/u BP     Sis Lange is an 79 y.o. male who presents for follow up    Complaints:   Rosacea with rhinophyma    CHART REVIEW   reports that he quit smoking about 3 years ago. His smoking use included cigarettes. He started smoking about 59 years ago. He has a 20.00 pack-year smoking history. He has been exposed to tobacco smoke.  He has never used smokeless tobacco.  Health Maintenance Due   Topic Date Due    Shingles vaccine (2 of 3) 09/10/2015    COVID-19 Vaccine (4 - Booster for Moderna series) 02/20/2022    Low dose CT lung screening &/or counseling  05/23/2023    Flu vaccine (1) 08/01/2023     Current Outpatient Medications   Medication Instructions    albuterol sulfate HFA (VENTOLIN HFA) 108 (90 Base) MCG/ACT inhaler 2 puffs, Inhalation, 4 TIMES DAILY PRN    albuterol sulfate HFA (VENTOLIN HFA) 108 (90 Base) MCG/ACT inhaler 2 puffs, Inhalation, EVERY 6 HOURS PRN

## 2023-09-24 DIAGNOSIS — J41.0 SIMPLE CHRONIC BRONCHITIS (HCC): ICD-10-CM

## 2023-09-25 RX ORDER — MONTELUKAST SODIUM 10 MG/1
TABLET ORAL
Qty: 90 TABLET | Refills: 5 | OUTPATIENT
Start: 2023-09-25

## 2023-12-26 RX ORDER — LISINOPRIL AND HYDROCHLOROTHIAZIDE 25; 20 MG/1; MG/1
1 TABLET ORAL DAILY
Qty: 90 TABLET | Refills: 0 | Status: SHIPPED | OUTPATIENT
Start: 2023-12-26

## 2024-03-07 SDOH — HEALTH STABILITY: PHYSICAL HEALTH: ON AVERAGE, HOW MANY DAYS PER WEEK DO YOU ENGAGE IN MODERATE TO STRENUOUS EXERCISE (LIKE A BRISK WALK)?: 3 DAYS

## 2024-03-07 SDOH — HEALTH STABILITY: PHYSICAL HEALTH: ON AVERAGE, HOW MANY MINUTES DO YOU ENGAGE IN EXERCISE AT THIS LEVEL?: PATIENT DECLINED

## 2024-03-07 ASSESSMENT — LIFESTYLE VARIABLES
HOW OFTEN DO YOU HAVE A DRINK CONTAINING ALCOHOL: 2-4 TIMES A MONTH
HOW OFTEN DO YOU HAVE SIX OR MORE DRINKS ON ONE OCCASION: 1
HOW OFTEN DURING THE LAST YEAR HAVE YOU NEEDED AN ALCOHOLIC DRINK FIRST THING IN THE MORNING TO GET YOURSELF GOING AFTER A NIGHT OF HEAVY DRINKING: NEVER
HOW OFTEN DURING THE LAST YEAR HAVE YOU FOUND THAT YOU WERE NOT ABLE TO STOP DRINKING ONCE YOU HAD STARTED: 0
HOW MANY STANDARD DRINKS CONTAINING ALCOHOL DO YOU HAVE ON A TYPICAL DAY: 3 OR 4
HOW OFTEN DURING THE LAST YEAR HAVE YOU NEEDED AN ALCOHOLIC DRINK FIRST THING IN THE MORNING TO GET YOURSELF GOING AFTER A NIGHT OF HEAVY DRINKING: 0
HAS A RELATIVE, FRIEND, DOCTOR, OR ANOTHER HEALTH PROFESSIONAL EXPRESSED CONCERN ABOUT YOUR DRINKING OR SUGGESTED YOU CUT DOWN: NO
HOW MANY STANDARD DRINKS CONTAINING ALCOHOL DO YOU HAVE ON A TYPICAL DAY: 2
HOW OFTEN DURING THE LAST YEAR HAVE YOU HAD A FEELING OF GUILT OR REMORSE AFTER DRINKING: 0
HAVE YOU OR SOMEONE ELSE BEEN INJURED AS A RESULT OF YOUR DRINKING: NO
HOW OFTEN DURING THE LAST YEAR HAVE YOU FAILED TO DO WHAT WAS NORMALLY EXPECTED FROM YOU BECAUSE OF DRINKING: NEVER
HAVE YOU OR SOMEONE ELSE BEEN INJURED AS A RESULT OF YOUR DRINKING: 0
HOW OFTEN DURING THE LAST YEAR HAVE YOU BEEN UNABLE TO REMEMBER WHAT HAPPENED THE NIGHT BEFORE BECAUSE YOU HAD BEEN DRINKING: NEVER
HAS A RELATIVE, FRIEND, DOCTOR, OR ANOTHER HEALTH PROFESSIONAL EXPRESSED CONCERN ABOUT YOUR DRINKING OR SUGGESTED YOU CUT DOWN: 0
HOW OFTEN DURING THE LAST YEAR HAVE YOU HAD A FEELING OF GUILT OR REMORSE AFTER DRINKING: NEVER
HOW OFTEN DURING THE LAST YEAR HAVE YOU FOUND THAT YOU WERE NOT ABLE TO STOP DRINKING ONCE YOU HAD STARTED: NEVER
HOW OFTEN DURING THE LAST YEAR HAVE YOU FAILED TO DO WHAT WAS NORMALLY EXPECTED FROM YOU BECAUSE OF DRINKING: 0
HOW OFTEN DO YOU HAVE A DRINK CONTAINING ALCOHOL: 3
HOW OFTEN DURING THE LAST YEAR HAVE YOU BEEN UNABLE TO REMEMBER WHAT HAPPENED THE NIGHT BEFORE BECAUSE YOU HAD BEEN DRINKING: 0

## 2024-03-07 ASSESSMENT — PATIENT HEALTH QUESTIONNAIRE - PHQ9
SUM OF ALL RESPONSES TO PHQ QUESTIONS 1-9: 0
SUM OF ALL RESPONSES TO PHQ QUESTIONS 1-9: 0
SUM OF ALL RESPONSES TO PHQ9 QUESTIONS 1 & 2: 0
2. FEELING DOWN, DEPRESSED OR HOPELESS: 0
SUM OF ALL RESPONSES TO PHQ QUESTIONS 1-9: 0
1. LITTLE INTEREST OR PLEASURE IN DOING THINGS: 0
SUM OF ALL RESPONSES TO PHQ QUESTIONS 1-9: 0

## 2024-03-08 ENCOUNTER — OFFICE VISIT (OUTPATIENT)
Dept: FAMILY MEDICINE CLINIC | Age: 69
End: 2024-03-08

## 2024-03-08 VITALS
HEART RATE: 64 BPM | WEIGHT: 197 LBS | HEIGHT: 69 IN | RESPIRATION RATE: 12 BRPM | SYSTOLIC BLOOD PRESSURE: 110 MMHG | DIASTOLIC BLOOD PRESSURE: 80 MMHG | BODY MASS INDEX: 29.18 KG/M2 | OXYGEN SATURATION: 94 %

## 2024-03-08 DIAGNOSIS — I10 ESSENTIAL HYPERTENSION: ICD-10-CM

## 2024-03-08 DIAGNOSIS — J41.0 SIMPLE CHRONIC BRONCHITIS (HCC): ICD-10-CM

## 2024-03-08 DIAGNOSIS — Z91.89 PULMONARY NODULE LESS THAN 1 CM IN DIAMETER WITH LOW RISK FOR MALIGNANT NEOPLASM: ICD-10-CM

## 2024-03-08 DIAGNOSIS — J45.40 MODERATE PERSISTENT ASTHMA WITHOUT COMPLICATION: ICD-10-CM

## 2024-03-08 DIAGNOSIS — R73.03 PREDIABETES: ICD-10-CM

## 2024-03-08 DIAGNOSIS — Z00.00 MEDICARE ANNUAL WELLNESS VISIT, SUBSEQUENT: ICD-10-CM

## 2024-03-08 DIAGNOSIS — K21.9 GASTROESOPHAGEAL REFLUX DISEASE, UNSPECIFIED WHETHER ESOPHAGITIS PRESENT: ICD-10-CM

## 2024-03-08 DIAGNOSIS — Z00.00 MEDICARE ANNUAL WELLNESS VISIT, SUBSEQUENT: Primary | ICD-10-CM

## 2024-03-08 DIAGNOSIS — E55.9 VITAMIN D DEFICIENCY: ICD-10-CM

## 2024-03-08 DIAGNOSIS — E78.5 HYPERLIPIDEMIA LDL GOAL <130: ICD-10-CM

## 2024-03-08 DIAGNOSIS — Z87.891 PERSONAL HISTORY OF TOBACCO USE: ICD-10-CM

## 2024-03-08 DIAGNOSIS — R91.1 PULMONARY NODULE LESS THAN 1 CM IN DIAMETER WITH LOW RISK FOR MALIGNANT NEOPLASM: ICD-10-CM

## 2024-03-08 DIAGNOSIS — J30.9 ALLERGIC RHINITIS, UNSPECIFIED SEASONALITY, UNSPECIFIED TRIGGER: ICD-10-CM

## 2024-03-08 LAB
25(OH)D3 SERPL-MCNC: 96.7 NG/ML
ALBUMIN SERPL-MCNC: 4.3 G/DL (ref 3.4–5)
ALBUMIN/GLOB SERPL: 1.7 {RATIO} (ref 1.1–2.2)
ALP SERPL-CCNC: 95 U/L (ref 40–129)
ALT SERPL-CCNC: 21 U/L (ref 10–40)
ANION GAP SERPL CALCULATED.3IONS-SCNC: 9 MMOL/L (ref 3–16)
AST SERPL-CCNC: 16 U/L (ref 15–37)
BILIRUB SERPL-MCNC: 0.4 MG/DL (ref 0–1)
BUN SERPL-MCNC: 17 MG/DL (ref 7–20)
CALCIUM SERPL-MCNC: 9.6 MG/DL (ref 8.3–10.6)
CHLORIDE SERPL-SCNC: 101 MMOL/L (ref 99–110)
CHOLEST SERPL-MCNC: 181 MG/DL (ref 0–199)
CO2 SERPL-SCNC: 31 MMOL/L (ref 21–32)
CREAT SERPL-MCNC: 1.2 MG/DL (ref 0.8–1.3)
GFR SERPLBLD CREATININE-BSD FMLA CKD-EPI: >60 ML/MIN/{1.73_M2}
GLUCOSE SERPL-MCNC: 99 MG/DL (ref 70–99)
HDLC SERPL-MCNC: 31 MG/DL (ref 40–60)
LDLC SERPL CALC-MCNC: 128 MG/DL
POTASSIUM SERPL-SCNC: 4.3 MMOL/L (ref 3.5–5.1)
PROT SERPL-MCNC: 6.9 G/DL (ref 6.4–8.2)
SODIUM SERPL-SCNC: 141 MMOL/L (ref 136–145)
TRIGL SERPL-MCNC: 110 MG/DL (ref 0–150)
VLDLC SERPL CALC-MCNC: 22 MG/DL

## 2024-03-08 RX ORDER — LISINOPRIL AND HYDROCHLOROTHIAZIDE 25; 20 MG/1; MG/1
1 TABLET ORAL DAILY
Qty: 90 TABLET | Refills: 1 | Status: SHIPPED | OUTPATIENT
Start: 2024-03-08

## 2024-03-08 SDOH — ECONOMIC STABILITY: FOOD INSECURITY: WITHIN THE PAST 12 MONTHS, YOU WORRIED THAT YOUR FOOD WOULD RUN OUT BEFORE YOU GOT MONEY TO BUY MORE.: NEVER TRUE

## 2024-03-08 SDOH — ECONOMIC STABILITY: INCOME INSECURITY: HOW HARD IS IT FOR YOU TO PAY FOR THE VERY BASICS LIKE FOOD, HOUSING, MEDICAL CARE, AND HEATING?: NOT HARD AT ALL

## 2024-03-08 SDOH — ECONOMIC STABILITY: FOOD INSECURITY: WITHIN THE PAST 12 MONTHS, THE FOOD YOU BOUGHT JUST DIDN'T LAST AND YOU DIDN'T HAVE MONEY TO GET MORE.: NEVER TRUE

## 2024-03-08 ASSESSMENT — PATIENT HEALTH QUESTIONNAIRE - PHQ9
SUM OF ALL RESPONSES TO PHQ QUESTIONS 1-9: 0
SUM OF ALL RESPONSES TO PHQ QUESTIONS 1-9: 0
2. FEELING DOWN, DEPRESSED OR HOPELESS: 0
1. LITTLE INTEREST OR PLEASURE IN DOING THINGS: 0
SUM OF ALL RESPONSES TO PHQ9 QUESTIONS 1 & 2: 0
SUM OF ALL RESPONSES TO PHQ QUESTIONS 1-9: 0
SUM OF ALL RESPONSES TO PHQ QUESTIONS 1-9: 0

## 2024-03-08 NOTE — PROGRESS NOTES
Medicare Annual Wellness Visit    Marcelo Hdez is here for Medicare AWV and Hypertension    Assessment & Plan   Medicare annual wellness visit, subsequent  -     Hemoglobin A1C; Future  -     Lipid Panel; Future  -     Comprehensive Metabolic Panel; Future  -     Vitamin D 25 Hydroxy; Future  Risk factor screening and interventions as below  Discussed importance of healthy lifestyle habits including diet, aerobic exercise, routine dental/eye exams and  preventative screenings and vaccinations.  Essential hypertension  -     lisinopril-hydroCHLOROthiazide (PRINZIDE;ZESTORETIC) 20-25 MG per tablet; Take 1 tablet by mouth daily, Disp-90 tablet, R-1Normal  -     Comprehensive Metabolic Panel; Future  Blood pressure is at goal on current therapy; continue meds and monitoring. Regular physical activity and healthy diet (low sodium, multiple servings of produce daily) was recommended.  Renal function to be assessed yearly.  Hyperlipidemia LDL goal <130  -     Lipid Panel; Future  Stable.  Recent FLP reviewed  (2/2023).  Declines statin- discussed risks and benefits. Reviewed lifestyle modifications and low fat diet for cholesterol control.  The 10-year ASCVD risk score (Shavonne LR, et al., 2019) is: 16.9%    Values used to calculate the score:      Age: 68 years      Sex: Male      Is Non- : No      Diabetic: No      Tobacco smoker: No      Systolic Blood Pressure: 110 mmHg      Is BP treated: Yes      HDL Cholesterol: 31 mg/dL      Total Cholesterol: 180 mg/dL  Prediabetes  -     Hemoglobin A1C; Future  HgbA1c 5.8% in February 2023- repeat today  Reviewed lifestyle modifications and low carb diet for blood sugar control.  Gastroesophageal reflux disease, unspecified whether esophagitis present  Well controlled with omeprazole 40 mg daily- no changes  Simple chronic bronchitis (HCC)  Well controlled- continue PRN albuterol and Singulair 10 daily  Moderate persistent asthma without

## 2024-03-09 LAB
EST. AVERAGE GLUCOSE BLD GHB EST-MCNC: 119.8 MG/DL
HBA1C MFR BLD: 5.8 %

## 2024-05-07 DIAGNOSIS — J41.0 SIMPLE CHRONIC BRONCHITIS (HCC): ICD-10-CM

## 2024-05-07 RX ORDER — MONTELUKAST SODIUM 10 MG/1
TABLET ORAL
Qty: 90 TABLET | Refills: 5 | OUTPATIENT
Start: 2024-05-07

## 2024-05-21 DIAGNOSIS — J41.0 SIMPLE CHRONIC BRONCHITIS (HCC): ICD-10-CM

## 2024-05-21 RX ORDER — MONTELUKAST SODIUM 10 MG/1
TABLET ORAL
Qty: 90 TABLET | Refills: 1 | Status: SHIPPED | OUTPATIENT
Start: 2024-05-21

## 2024-05-21 NOTE — TELEPHONE ENCOUNTER
----- Message from Marcelo Hdez sent at 5/21/2024  6:33 AM EDT -----  Regarding: Prescription refill  Contact: [phone]  i have recently depleted my Montelukast SOD 10 mg tab prescription i have no refills left. My breathing seems a little more labored since I have not been taking. Can I please get a refill? Thank you

## 2024-06-05 ENCOUNTER — OFFICE VISIT (OUTPATIENT)
Dept: PULMONOLOGY | Age: 69
End: 2024-06-05
Payer: MEDICARE

## 2024-06-05 VITALS
HEIGHT: 69 IN | BODY MASS INDEX: 28.97 KG/M2 | SYSTOLIC BLOOD PRESSURE: 110 MMHG | HEART RATE: 68 BPM | RESPIRATION RATE: 18 BRPM | OXYGEN SATURATION: 95 % | WEIGHT: 195.6 LBS | DIASTOLIC BLOOD PRESSURE: 60 MMHG | TEMPERATURE: 96.5 F

## 2024-06-05 DIAGNOSIS — J45.41 MODERATE PERSISTENT ASTHMA WITH (ACUTE) EXACERBATION: Primary | ICD-10-CM

## 2024-06-05 DIAGNOSIS — K21.9 GASTROESOPHAGEAL REFLUX DISEASE WITHOUT ESOPHAGITIS: ICD-10-CM

## 2024-06-05 DIAGNOSIS — J31.0 CHRONIC RHINITIS: ICD-10-CM

## 2024-06-05 PROCEDURE — 3074F SYST BP LT 130 MM HG: CPT | Performed by: INTERNAL MEDICINE

## 2024-06-05 PROCEDURE — G8417 CALC BMI ABV UP PARAM F/U: HCPCS | Performed by: INTERNAL MEDICINE

## 2024-06-05 PROCEDURE — 1036F TOBACCO NON-USER: CPT | Performed by: INTERNAL MEDICINE

## 2024-06-05 PROCEDURE — G8427 DOCREV CUR MEDS BY ELIG CLIN: HCPCS | Performed by: INTERNAL MEDICINE

## 2024-06-05 PROCEDURE — 1123F ACP DISCUSS/DSCN MKR DOCD: CPT | Performed by: INTERNAL MEDICINE

## 2024-06-05 PROCEDURE — 3078F DIAST BP <80 MM HG: CPT | Performed by: INTERNAL MEDICINE

## 2024-06-05 PROCEDURE — 3017F COLORECTAL CA SCREEN DOC REV: CPT | Performed by: INTERNAL MEDICINE

## 2024-06-05 PROCEDURE — 99214 OFFICE O/P EST MOD 30 MIN: CPT | Performed by: INTERNAL MEDICINE

## 2024-06-05 RX ORDER — FLUTICASONE FUROATE, UMECLIDINIUM BROMIDE AND VILANTEROL TRIFENATATE 200; 62.5; 25 UG/1; UG/1; UG/1
1 POWDER RESPIRATORY (INHALATION) DAILY
Qty: 1 EACH | Refills: 0 | Status: SHIPPED | COMMUNITY
Start: 2024-06-05

## 2024-06-05 RX ORDER — PREDNISONE 10 MG/1
10 TABLET ORAL DAILY
Qty: 15 TABLET | Refills: 2 | Status: SHIPPED | OUTPATIENT
Start: 2024-06-05

## 2024-06-05 RX ORDER — FLUTICASONE FUROATE, UMECLIDINIUM BROMIDE AND VILANTEROL TRIFENATATE 200; 62.5; 25 UG/1; UG/1; UG/1
1 POWDER RESPIRATORY (INHALATION) DAILY
Qty: 1 EACH | Refills: 0 | Status: SHIPPED | OUTPATIENT
Start: 2024-06-05 | End: 2024-06-05 | Stop reason: SDUPTHER

## 2024-06-05 NOTE — PROGRESS NOTES
Pulmonary Progress           REASON FOR CONSULTATION:  Chief Complaint   Patient presents with    Follow-up    Asthma    Shortness of Breath    Wheezing    Cough        Consult at request of John Dan MD     PCP: John Dan MD        Assessment and Plan:   Diagnosis Orders   1. Moderate persistent asthma with (acute) exacerbation        2. Chronic rhinitis        3. Gastroesophageal reflux disease without esophagitis              Plan:  Having issue affording maintains inhalers, will order Trelegy and check if he could get help with cost.   Prednisone taper.   Continue Duoneb as needed.   Intranasal steroid and antihistamine.  Strict antireflux precautions and PPI.      HISTORY OF PRESENT ILLNESS: Marcelo Hdez is very pleasant 68 y.o. year old gentleman with medical history stated below significant for former smoker quit in 2020, severe persistent asthma with recent exacerbation, treated with steroid and anti allergic meds.  He has a pet dog.  No mold in his house.    Here for follow up   C/o worsening cough, wheezing, sob since he cleaned up using bleach   Has not been using maintains inhalers due to cost.   C/o active GERD symptoms       Past Medical History:   Diagnosis Date    GERD (gastroesophageal reflux disease)     Hypercholesteremia     Hyperlipidemia LDL goal <130 6/16/2011    Hypertension     Tobacco abuse     Vitamin D deficiency        History reviewed. No pertinent surgical history.    family history includes Cancer in his mother; Heart Disease in his sister; Heart Failure in his brother and father; Heart Surgery in his sister; Hypertension in his father and sister; Lung Cancer (age of onset: 63) in his brother.      SOCIAL HISTORY:   reports that he quit smoking about 3 years ago. His smoking use included cigarettes. He started smoking about 60 years ago. He has a 28.2 pack-year smoking

## 2024-06-24 ENCOUNTER — PATIENT MESSAGE (OUTPATIENT)
Dept: PULMONOLOGY | Age: 69
End: 2024-06-24

## 2024-06-24 DIAGNOSIS — J45.41 MODERATE PERSISTENT ASTHMA WITH (ACUTE) EXACERBATION: Primary | ICD-10-CM

## 2024-06-24 RX ORDER — FLUTICASONE FUROATE, UMECLIDINIUM BROMIDE AND VILANTEROL TRIFENATATE 200; 62.5; 25 UG/1; UG/1; UG/1
1 POWDER RESPIRATORY (INHALATION) DAILY
Qty: 1 EACH | Refills: 0 | Status: SHIPPED | OUTPATIENT
Start: 2024-06-24

## 2024-06-24 NOTE — TELEPHONE ENCOUNTER
From: Marcelo Hdez  To: Dr. Neno Sevilla  Sent: 6/24/2024 12:04 PM EDT  Subject: trelegy    your nurse gave me a coupon for a free trelegy disk but there is no prescription for it. coupon expires in a few days. can we we get that called into [facility]. thank you

## 2024-06-26 RX ORDER — FLUTICASONE FUROATE, UMECLIDINIUM BROMIDE AND VILANTEROL TRIFENATATE 200; 62.5; 25 UG/1; UG/1; UG/1
1 POWDER RESPIRATORY (INHALATION) DAILY
Qty: 3 EACH | Refills: 3 | Status: SHIPPED | OUTPATIENT
Start: 2024-06-26

## 2024-07-15 ENCOUNTER — TELEPHONE (OUTPATIENT)
Dept: SURGERY | Age: 69
End: 2024-07-15

## 2024-07-15 ENCOUNTER — APPOINTMENT (OUTPATIENT)
Dept: ULTRASOUND IMAGING | Age: 69
End: 2024-07-15
Payer: MEDICARE

## 2024-07-15 ENCOUNTER — HOSPITAL ENCOUNTER (EMERGENCY)
Age: 69
Discharge: HOME | End: 2024-07-15
Payer: MEDICARE

## 2024-07-15 VITALS
SYSTOLIC BLOOD PRESSURE: 129 MMHG | OXYGEN SATURATION: 92 % | WEIGHT: 192.46 LBS | HEART RATE: 68 BPM | DIASTOLIC BLOOD PRESSURE: 70 MMHG | BODY MASS INDEX: 28.42 KG/M2 | TEMPERATURE: 97.7 F | RESPIRATION RATE: 16 BRPM

## 2024-07-15 DIAGNOSIS — K81.9 CHOLECYSTITIS: ICD-10-CM

## 2024-07-15 DIAGNOSIS — R10.11 ABDOMINAL PAIN, RIGHT UPPER QUADRANT: ICD-10-CM

## 2024-07-15 DIAGNOSIS — K80.20 CALCULUS OF GALLBLADDER WITHOUT CHOLECYSTITIS WITHOUT OBSTRUCTION: Primary | ICD-10-CM

## 2024-07-15 LAB
ALBUMIN SERPL-MCNC: 4.2 G/DL (ref 3.4–5)
ALP SERPL-CCNC: 90 U/L (ref 40–129)
ALT SERPL-CCNC: 25 U/L (ref 10–40)
ANION GAP SERPL CALCULATED.3IONS-SCNC: 13 MMOL/L (ref 3–16)
AST SERPL-CCNC: 16 U/L (ref 15–37)
BASOPHILS # BLD: 0.1 K/UL (ref 0–0.2)
BASOPHILS NFR BLD: 0.7 %
BILIRUB DIRECT SERPL-MCNC: <0.2 MG/DL (ref 0–0.3)
BILIRUB INDIRECT SERPL-MCNC: NORMAL MG/DL (ref 0–1)
BILIRUB SERPL-MCNC: 0.8 MG/DL (ref 0–1)
BUN SERPL-MCNC: 20 MG/DL (ref 7–20)
CALCIUM SERPL-MCNC: 9.5 MG/DL (ref 8.3–10.6)
CHLORIDE SERPL-SCNC: 99 MMOL/L (ref 99–110)
CO2 SERPL-SCNC: 25 MMOL/L (ref 21–32)
CREAT SERPL-MCNC: 1 MG/DL (ref 0.8–1.3)
DEPRECATED RDW RBC AUTO: 13.8 % (ref 12.4–15.4)
EOSINOPHIL # BLD: 0.2 K/UL (ref 0–0.6)
EOSINOPHIL NFR BLD: 2.5 %
GFR SERPLBLD CREATININE-BSD FMLA CKD-EPI: 82 ML/MIN/{1.73_M2}
GLUCOSE SERPL-MCNC: 120 MG/DL (ref 70–99)
HCT VFR BLD AUTO: 47 % (ref 40.5–52.5)
HGB BLD-MCNC: 16 G/DL (ref 13.5–17.5)
LIPASE SERPL-CCNC: 20 U/L (ref 13–60)
LYMPHOCYTES # BLD: 1.9 K/UL (ref 1–5.1)
LYMPHOCYTES NFR BLD: 20.7 %
MCH RBC QN AUTO: 31.2 PG (ref 26–34)
MCHC RBC AUTO-ENTMCNC: 34.2 G/DL (ref 31–36)
MCV RBC AUTO: 91.4 FL (ref 80–100)
MONOCYTES # BLD: 0.6 K/UL (ref 0–1.3)
MONOCYTES NFR BLD: 7.2 %
NEUTROPHILS # BLD: 6.2 K/UL (ref 1.7–7.7)
NEUTROPHILS NFR BLD: 68.9 %
PLATELET # BLD AUTO: 204 K/UL (ref 135–450)
PMV BLD AUTO: 8.3 FL (ref 5–10.5)
POTASSIUM SERPL-SCNC: 3.8 MMOL/L (ref 3.5–5.1)
PROT SERPL-MCNC: 7.4 G/DL (ref 6.4–8.2)
RBC # BLD AUTO: 5.14 M/UL (ref 4.2–5.9)
SODIUM SERPL-SCNC: 137 MMOL/L (ref 136–145)
TROPONIN, HIGH SENSITIVITY: 12 NG/L (ref 0–22)
WBC # BLD AUTO: 9 K/UL (ref 4–11)

## 2024-07-15 PROCEDURE — 83690 ASSAY OF LIPASE: CPT

## 2024-07-15 PROCEDURE — 93005 ELECTROCARDIOGRAM TRACING: CPT | Performed by: PHYSICIAN ASSISTANT

## 2024-07-15 PROCEDURE — 85025 COMPLETE CBC W/AUTO DIFF WBC: CPT

## 2024-07-15 PROCEDURE — 6360000002 HC RX W HCPCS: Performed by: PHYSICIAN ASSISTANT

## 2024-07-15 PROCEDURE — 99284 EMERGENCY DEPT VISIT MOD MDM: CPT

## 2024-07-15 PROCEDURE — 96374 THER/PROPH/DIAG INJ IV PUSH: CPT

## 2024-07-15 PROCEDURE — 80076 HEPATIC FUNCTION PANEL: CPT

## 2024-07-15 PROCEDURE — 84484 ASSAY OF TROPONIN QUANT: CPT

## 2024-07-15 PROCEDURE — 80048 BASIC METABOLIC PNL TOTAL CA: CPT

## 2024-07-15 PROCEDURE — APPSS15 APP SPLIT SHARED TIME 0-15 MINUTES: Performed by: PHYSICIAN ASSISTANT

## 2024-07-15 PROCEDURE — 76705 ECHO EXAM OF ABDOMEN: CPT

## 2024-07-15 PROCEDURE — APPNB15 APP NON BILLABLE TIME 0-15 MINS: Performed by: PHYSICIAN ASSISTANT

## 2024-07-15 RX ORDER — KETOROLAC TROMETHAMINE 15 MG/ML
15 INJECTION, SOLUTION INTRAMUSCULAR; INTRAVENOUS ONCE
Status: COMPLETED | OUTPATIENT
Start: 2024-07-15 | End: 2024-07-15

## 2024-07-15 RX ORDER — DOCUSATE SODIUM 100 MG/1
100 CAPSULE, LIQUID FILLED ORAL 2 TIMES DAILY
Qty: 30 CAPSULE | Refills: 0 | Status: SHIPPED | OUTPATIENT
Start: 2024-07-15

## 2024-07-15 RX ORDER — AMOXICILLIN AND CLAVULANATE POTASSIUM 875; 125 MG/1; MG/1
1 TABLET, FILM COATED ORAL 2 TIMES DAILY
Qty: 10 TABLET | Refills: 0 | Status: SHIPPED | OUTPATIENT
Start: 2024-07-15 | End: 2024-07-20

## 2024-07-15 RX ORDER — OXYCODONE HYDROCHLORIDE 5 MG/1
5 TABLET ORAL EVERY 8 HOURS PRN
Qty: 9 TABLET | Refills: 0 | Status: ON HOLD | OUTPATIENT
Start: 2024-07-15 | End: 2024-07-17 | Stop reason: HOSPADM

## 2024-07-15 RX ADMIN — KETOROLAC TROMETHAMINE 15 MG: 15 INJECTION, SOLUTION INTRAMUSCULAR; INTRAVENOUS at 11:34

## 2024-07-15 ASSESSMENT — ENCOUNTER SYMPTOMS
CONSTIPATION: 0
CHEST TIGHTNESS: 0
VOMITING: 0
NAUSEA: 1
COUGH: 0
ABDOMINAL PAIN: 1
SHORTNESS OF BREATH: 0
COLOR CHANGE: 0
DIARRHEA: 0

## 2024-07-15 ASSESSMENT — PAIN SCALES - GENERAL
PAINLEVEL_OUTOF10: 8
PAINLEVEL_OUTOF10: 9

## 2024-07-15 ASSESSMENT — PAIN DESCRIPTION - DESCRIPTORS: DESCRIPTORS: CRAMPING

## 2024-07-15 ASSESSMENT — PAIN DESCRIPTION - LOCATION: LOCATION: ABDOMEN;BACK

## 2024-07-15 ASSESSMENT — LIFESTYLE VARIABLES: HOW OFTEN DO YOU HAVE A DRINK CONTAINING ALCOHOL: NEVER

## 2024-07-15 NOTE — ED PROVIDER NOTES
I did not perform an evaluation of Marcelo Hdez but was asked to review his EKG.     EKG interpreted by myself.   Rate: normal, rate 64  Rhythm: NSR  Axis: normal  Intervals: within normal limits  ST Segments: no acute abnormality  T waves: no acute abnormality  Comparison: Compared to EKG on June 17, 2022, there is no significant change  Impression: NSR with no acute abnormality       Dionisio Ahmadi MD  07/15/24 120

## 2024-07-15 NOTE — ED NOTES
All follow up care discussed. Pt verbalized understandings. No other concerns voiced. Prescriptions discussed.

## 2024-07-15 NOTE — CONSULTS
diplopia, tinnitus or vertigo  Resp Denies any shortness of breath, cough or wheezing  Cardiac Denies any chest pain, palpitations, claudication or edema  GI Denies any melena, hematochezia, hematemesis or pyrosis   Denies any frequency, urgency, hesitancy or incontinence  Heme Denies bruising or bleeding easily  Neuro Denies any focal motor or sensory deficits  All other systems negative  OBJECTIVE:   VITALS:  weight is 87.3 kg (192 lb 7.4 oz). His oral temperature is 97.7 °F (36.5 °C). His blood pressure is 129/70 and his pulse is 68. His respiration is 16 and oxygen saturation is 92%.   CONSTITUTIONAL: Alert and oriented times 3, no acute distress and cooperative to examination with proper mood and affect.  SKIN: Skin color, texture, turgor normal. No rashes or lesions.  LYMPH: no cervical nodes, no inguinal nodes  HEENT: Head is normocephalic, atraumatic. EOMI, PERRLA.  NECK: Supple, symmetrical, trachea midline, no adenopathy, thyroid symmetric, not enlarged and no tenderness, skin normal.  CHEST/LUNGS: chest symmetric with normal A/P diameter, normal respiratory rate and rhythm  CARDIOVASCULAR: Heart sounds are normal.  Regular rate and rhythm   ABDOMEN: Normal shape.  Tenderness: epigastric and RUQ  RECTAL: deferred, not clinically indicated  NEUROLOGIC: There are no focalizing motor or sensory deficits. CN II-XII are grossly intact..   EXTREMITIES: no cyanosis, no clubbing, and no edema.  LABS:     Recent Labs     07/15/24  0945   WBC 9.0   HGB 16.0   HCT 47.0         K 3.8   CL 99   CO2 25   BUN 20   CREATININE 1.0   CALCIUM 9.5   AST 16   ALT 25   BILITOT 0.8   BILIDIR <0.2     Recent Labs     07/15/24  0945   ALKPHOS 90   ALT 25   AST 16   BILITOT 0.8   BILIDIR <0.2   LIPASE 20.0     CBC:   Lab Results   Component Value Date/Time    WBC 9.0 07/15/2024 09:45 AM    RBC 5.14 07/15/2024 09:45 AM    HGB 16.0 07/15/2024 09:45 AM    HCT 47.0 07/15/2024 09:45 AM    MCV 91.4 07/15/2024 09:45 AM

## 2024-07-15 NOTE — DISCHARGE INSTRUCTIONS
You have been prescribed an opiate medication, oxycodone. Opiates can be addictive, even in small quantities. Take only what you need to bear your pain. Return any unused portion to the pharmacy from which you obtained it. Opiates may also be sedating. While this is not a problem under normal circumstances, when taken with alcohol or while driving or operating heavy machinery, this medicine could cause you to become too sleepy and/or hurt yourself or others. Therefore, it is very important that you DO NOT DRIVE, DRINK ALCOHOL, OR OPERATE HEAVY MACHINERY WHILE TAKING THE MEDICINE(S) LISTED ABOVE.

## 2024-07-15 NOTE — ED PROVIDER NOTES
[facility] EMERGENCY DEPARTMENT  EMERGENCY DEPARTMENT ENCOUNTER        Pt Name: Marcelo Hdez  MRN: [id number]  Birthdate 1955  Date of evaluation: 7/15/2024  Provider: JOHNATHON Mcdonald  PCP: John Dan MD  Note Started: 8:49 AM EDT 7/15/24      TIFFANY. I have evaluated this patient.        CHIEF COMPLAINT       Chief Complaint   Patient presents with    Abdominal Cramping    Back Pain     RUQ cramping and lower back pain starting yesterday. Has had some nausea.        HISTORY OF PRESENT ILLNESS: 1 or more Elements     History From: Patient  Limitations to history : None    Marcelo Hdez is a 68 y.o. male who presents to the emergency department today with complaints of abdominal pain.  Patient reports that symptoms started yesterday around 2:00 in the afternoon, a little while after eating [employer] and chips and dip.  He reports that the pain radiates through to between his shoulder blades in his upper back.  The pain has been rather persistent, and prevented him from getting much sleep last night.  He has associated nausea but denies any vomiting, constipation, diarrhea, or urinary symptoms.  He has had similar episodes that have been very intermittent, not lasting nearly as long or has been as severe as this 1 over the last few months.  He denies previous abdominal surgeries.  He has no chest pain, shortness of breath, difficulty breathing, fever or chills.  He has no further complaints.    Nursing Notes were all reviewed and agreed with or any disagreements were addressed in the HPI.    REVIEW OF SYSTEMS :      Review of Systems   Constitutional:  Negative for chills and fever.   Respiratory:  Negative for cough, chest tightness and shortness of breath.    Cardiovascular:  Negative for chest pain and palpitations.   Gastrointestinal:  Positive for abdominal pain (RUQ, epigastric region) and nausea. Negative for constipation, diarrhea and vomiting.   Genitourinary:  Negative

## 2024-07-16 ENCOUNTER — ANESTHESIA EVENT (OUTPATIENT)
Dept: OPERATING ROOM | Age: 69
End: 2024-07-16
Payer: MEDICARE

## 2024-07-16 LAB
EKG ATRIAL RATE: 64 BPM
EKG DIAGNOSIS: NORMAL
EKG P AXIS: 44 DEGREES
EKG P-R INTERVAL: 122 MS
EKG Q-T INTERVAL: 422 MS
EKG QRS DURATION: 90 MS
EKG QTC CALCULATION (BAZETT): 435 MS
EKG R AXIS: -8 DEGREES
EKG T AXIS: 57 DEGREES
EKG VENTRICULAR RATE: 64 BPM

## 2024-07-16 PROCEDURE — 93010 ELECTROCARDIOGRAM REPORT: CPT | Performed by: INTERNAL MEDICINE

## 2024-07-17 ENCOUNTER — APPOINTMENT (OUTPATIENT)
Dept: GENERAL RADIOLOGY | Age: 69
End: 2024-07-17
Attending: SURGERY
Payer: MEDICARE

## 2024-07-17 ENCOUNTER — ANESTHESIA (OUTPATIENT)
Dept: OPERATING ROOM | Age: 69
End: 2024-07-17
Payer: MEDICARE

## 2024-07-17 ENCOUNTER — HOSPITAL ENCOUNTER (OUTPATIENT)
Age: 69
Setting detail: OUTPATIENT SURGERY
Discharge: HOME | End: 2024-07-17
Attending: SURGERY | Admitting: SURGERY
Payer: MEDICARE

## 2024-07-17 VITALS
DIASTOLIC BLOOD PRESSURE: 72 MMHG | SYSTOLIC BLOOD PRESSURE: 106 MMHG | WEIGHT: 186.95 LBS | HEIGHT: 69 IN | TEMPERATURE: 97.7 F | OXYGEN SATURATION: 92 % | RESPIRATION RATE: 20 BRPM | HEART RATE: 78 BPM | BODY MASS INDEX: 27.69 KG/M2

## 2024-07-17 DIAGNOSIS — K80.20 SYMPTOMATIC CHOLELITHIASIS: Primary | ICD-10-CM

## 2024-07-17 DIAGNOSIS — K81.9 CHOLECYSTITIS: ICD-10-CM

## 2024-07-17 PROCEDURE — 2580000003 HC RX 258: Performed by: SURGERY

## 2024-07-17 PROCEDURE — 7100000010 HC PHASE II RECOVERY - FIRST 15 MIN: Performed by: SURGERY

## 2024-07-17 PROCEDURE — 47563 LAPARO CHOLECYSTECTOMY/GRAPH: CPT | Performed by: SURGERY

## 2024-07-17 PROCEDURE — 94640 AIRWAY INHALATION TREATMENT: CPT

## 2024-07-17 PROCEDURE — 3700000000 HC ANESTHESIA ATTENDED CARE: Performed by: SURGERY

## 2024-07-17 PROCEDURE — 7100000011 HC PHASE II RECOVERY - ADDTL 15 MIN: Performed by: SURGERY

## 2024-07-17 PROCEDURE — 6370000000 HC RX 637 (ALT 250 FOR IP): Performed by: ANESTHESIOLOGY

## 2024-07-17 PROCEDURE — 6360000002 HC RX W HCPCS: Performed by: NURSE ANESTHETIST, CERTIFIED REGISTERED

## 2024-07-17 PROCEDURE — 7100000001 HC PACU RECOVERY - ADDTL 15 MIN: Performed by: SURGERY

## 2024-07-17 PROCEDURE — 2720000010 HC SURG SUPPLY STERILE: Performed by: SURGERY

## 2024-07-17 PROCEDURE — 94760 N-INVAS EAR/PLS OXIMETRY 1: CPT

## 2024-07-17 PROCEDURE — 6360000002 HC RX W HCPCS: Performed by: SURGERY

## 2024-07-17 PROCEDURE — 88304 TISSUE EXAM BY PATHOLOGIST: CPT

## 2024-07-17 PROCEDURE — 6360000002 HC RX W HCPCS: Performed by: ANESTHESIOLOGY

## 2024-07-17 PROCEDURE — A4217 STERILE WATER/SALINE, 500 ML: HCPCS | Performed by: SURGERY

## 2024-07-17 PROCEDURE — 2709999900 HC NON-CHARGEABLE SUPPLY: Performed by: SURGERY

## 2024-07-17 PROCEDURE — 2500000003 HC RX 250 WO HCPCS: Performed by: NURSE ANESTHETIST, CERTIFIED REGISTERED

## 2024-07-17 PROCEDURE — 6360000004 HC RX CONTRAST MEDICATION: Performed by: SURGERY

## 2024-07-17 PROCEDURE — 3600000004 HC SURGERY LEVEL 4 BASE: Performed by: SURGERY

## 2024-07-17 PROCEDURE — 3700000001 HC ADD 15 MINUTES (ANESTHESIA): Performed by: SURGERY

## 2024-07-17 PROCEDURE — 74300 X-RAY BILE DUCTS/PANCREAS: CPT

## 2024-07-17 PROCEDURE — 3600000014 HC SURGERY LEVEL 4 ADDTL 15MIN: Performed by: SURGERY

## 2024-07-17 PROCEDURE — 2580000003 HC RX 258: Performed by: NURSE ANESTHETIST, CERTIFIED REGISTERED

## 2024-07-17 PROCEDURE — 7100000000 HC PACU RECOVERY - FIRST 15 MIN: Performed by: SURGERY

## 2024-07-17 RX ORDER — MAGNESIUM HYDROXIDE 1200 MG/15ML
LIQUID ORAL CONTINUOUS PRN
Status: COMPLETED | OUTPATIENT
Start: 2024-07-17 | End: 2024-07-17

## 2024-07-17 RX ORDER — ONDANSETRON 2 MG/ML
INJECTION INTRAMUSCULAR; INTRAVENOUS PRN
Status: DISCONTINUED | OUTPATIENT
Start: 2024-07-17 | End: 2024-07-17 | Stop reason: SDUPTHER

## 2024-07-17 RX ORDER — FENTANYL CITRATE 0.05 MG/ML
25 INJECTION, SOLUTION INTRAMUSCULAR; INTRAVENOUS EVERY 5 MIN PRN
Status: DISCONTINUED | OUTPATIENT
Start: 2024-07-17 | End: 2024-07-17 | Stop reason: HOSPADM

## 2024-07-17 RX ORDER — SODIUM CHLORIDE 9 MG/ML
INJECTION, SOLUTION INTRAVENOUS PRN
Status: DISCONTINUED | OUTPATIENT
Start: 2024-07-17 | End: 2024-07-17 | Stop reason: HOSPADM

## 2024-07-17 RX ORDER — SODIUM CHLORIDE 0.9 % (FLUSH) 0.9 %
5-40 SYRINGE (ML) INJECTION EVERY 12 HOURS SCHEDULED
Status: DISCONTINUED | OUTPATIENT
Start: 2024-07-17 | End: 2024-07-17 | Stop reason: HOSPADM

## 2024-07-17 RX ORDER — SODIUM CHLORIDE 9 MG/ML
INJECTION, SOLUTION INTRAVENOUS CONTINUOUS PRN
Status: DISCONTINUED | OUTPATIENT
Start: 2024-07-17 | End: 2024-07-17 | Stop reason: SDUPTHER

## 2024-07-17 RX ORDER — OXYCODONE HYDROCHLORIDE AND ACETAMINOPHEN 5; 325 MG/1; MG/1
1 TABLET ORAL
Status: DISCONTINUED | OUTPATIENT
Start: 2024-07-17 | End: 2024-07-17 | Stop reason: HOSPADM

## 2024-07-17 RX ORDER — BUPIVACAINE HYDROCHLORIDE 5 MG/ML
INJECTION, SOLUTION EPIDURAL; INTRACAUDAL
Status: COMPLETED | OUTPATIENT
Start: 2024-07-17 | End: 2024-07-17

## 2024-07-17 RX ORDER — IPRATROPIUM BROMIDE AND ALBUTEROL SULFATE 2.5; .5 MG/3ML; MG/3ML
1 SOLUTION RESPIRATORY (INHALATION) ONCE
Status: COMPLETED | OUTPATIENT
Start: 2024-07-17 | End: 2024-07-17

## 2024-07-17 RX ORDER — SUCCINYLCHOLINE/SOD CL,ISO/PF 200MG/10ML
SYRINGE (ML) INTRAVENOUS PRN
Status: DISCONTINUED | OUTPATIENT
Start: 2024-07-17 | End: 2024-07-17 | Stop reason: SDUPTHER

## 2024-07-17 RX ORDER — PROPOFOL 10 MG/ML
INJECTION, EMULSION INTRAVENOUS PRN
Status: DISCONTINUED | OUTPATIENT
Start: 2024-07-17 | End: 2024-07-17 | Stop reason: SDUPTHER

## 2024-07-17 RX ORDER — SODIUM CHLORIDE 0.9 % (FLUSH) 0.9 %
5-40 SYRINGE (ML) INJECTION PRN
Status: DISCONTINUED | OUTPATIENT
Start: 2024-07-17 | End: 2024-07-17 | Stop reason: HOSPADM

## 2024-07-17 RX ORDER — FENTANYL CITRATE 50 UG/ML
INJECTION, SOLUTION INTRAMUSCULAR; INTRAVENOUS PRN
Status: DISCONTINUED | OUTPATIENT
Start: 2024-07-17 | End: 2024-07-17 | Stop reason: SDUPTHER

## 2024-07-17 RX ORDER — ONDANSETRON 2 MG/ML
4 INJECTION INTRAMUSCULAR; INTRAVENOUS
Status: DISCONTINUED | OUTPATIENT
Start: 2024-07-17 | End: 2024-07-17 | Stop reason: HOSPADM

## 2024-07-17 RX ORDER — PHENYLEPHRINE HCL IN 0.9% NACL 1 MG/10 ML
SYRINGE (ML) INTRAVENOUS PRN
Status: DISCONTINUED | OUTPATIENT
Start: 2024-07-17 | End: 2024-07-17 | Stop reason: SDUPTHER

## 2024-07-17 RX ORDER — LIDOCAINE HYDROCHLORIDE 20 MG/ML
INJECTION, SOLUTION EPIDURAL; INFILTRATION; INTRACAUDAL; PERINEURAL PRN
Status: DISCONTINUED | OUTPATIENT
Start: 2024-07-17 | End: 2024-07-17 | Stop reason: SDUPTHER

## 2024-07-17 RX ORDER — MIDAZOLAM HYDROCHLORIDE 1 MG/ML
INJECTION INTRAMUSCULAR; INTRAVENOUS PRN
Status: DISCONTINUED | OUTPATIENT
Start: 2024-07-17 | End: 2024-07-17 | Stop reason: SDUPTHER

## 2024-07-17 RX ORDER — MEPERIDINE HYDROCHLORIDE 25 MG/ML
12.5 INJECTION INTRAMUSCULAR; INTRAVENOUS; SUBCUTANEOUS EVERY 5 MIN PRN
Status: DISCONTINUED | OUTPATIENT
Start: 2024-07-17 | End: 2024-07-17 | Stop reason: HOSPADM

## 2024-07-17 RX ORDER — NALOXONE HYDROCHLORIDE 0.4 MG/ML
INJECTION, SOLUTION INTRAMUSCULAR; INTRAVENOUS; SUBCUTANEOUS PRN
Status: DISCONTINUED | OUTPATIENT
Start: 2024-07-17 | End: 2024-07-17 | Stop reason: HOSPADM

## 2024-07-17 RX ORDER — OXYCODONE HYDROCHLORIDE AND ACETAMINOPHEN 5; 325 MG/1; MG/1
2 TABLET ORAL
Status: COMPLETED | OUTPATIENT
Start: 2024-07-17 | End: 2024-07-17

## 2024-07-17 RX ORDER — FENTANYL CITRATE 0.05 MG/ML
50 INJECTION, SOLUTION INTRAMUSCULAR; INTRAVENOUS EVERY 5 MIN PRN
Status: DISCONTINUED | OUTPATIENT
Start: 2024-07-17 | End: 2024-07-17 | Stop reason: HOSPADM

## 2024-07-17 RX ORDER — GLYCOPYRROLATE 0.2 MG/ML
INJECTION INTRAMUSCULAR; INTRAVENOUS PRN
Status: DISCONTINUED | OUTPATIENT
Start: 2024-07-17 | End: 2024-07-17 | Stop reason: SDUPTHER

## 2024-07-17 RX ORDER — DEXAMETHASONE SODIUM PHOSPHATE 4 MG/ML
INJECTION, SOLUTION INTRA-ARTICULAR; INTRALESIONAL; INTRAMUSCULAR; INTRAVENOUS; SOFT TISSUE PRN
Status: DISCONTINUED | OUTPATIENT
Start: 2024-07-17 | End: 2024-07-17 | Stop reason: SDUPTHER

## 2024-07-17 RX ORDER — IBUPROFEN 400 MG/1
400 TABLET ORAL 3 TIMES DAILY PRN
Qty: 60 TABLET | Refills: 0 | Status: SHIPPED | OUTPATIENT
Start: 2024-07-17

## 2024-07-17 RX ORDER — ROCURONIUM BROMIDE 10 MG/ML
INJECTION, SOLUTION INTRAVENOUS PRN
Status: DISCONTINUED | OUTPATIENT
Start: 2024-07-17 | End: 2024-07-17 | Stop reason: SDUPTHER

## 2024-07-17 RX ORDER — OXYCODONE HYDROCHLORIDE AND ACETAMINOPHEN 5; 325 MG/1; MG/1
1-2 TABLET ORAL EVERY 6 HOURS PRN
Qty: 28 TABLET | Refills: 0 | Status: SHIPPED | OUTPATIENT
Start: 2024-07-17 | End: 2024-07-24

## 2024-07-17 RX ADMIN — Medication 200 MCG: at 11:43

## 2024-07-17 RX ADMIN — ROCURONIUM BROMIDE 30 MG: 10 INJECTION, SOLUTION INTRAVENOUS at 11:33

## 2024-07-17 RX ADMIN — SUGAMMADEX 200 MG: 100 INJECTION, SOLUTION INTRAVENOUS at 12:21

## 2024-07-17 RX ADMIN — Medication 120 MG: at 11:30

## 2024-07-17 RX ADMIN — SODIUM CHLORIDE: 9 INJECTION, SOLUTION INTRAVENOUS at 11:26

## 2024-07-17 RX ADMIN — SODIUM CHLORIDE: 9 INJECTION, SOLUTION INTRAVENOUS at 12:00

## 2024-07-17 RX ADMIN — Medication 100 MCG: at 11:33

## 2024-07-17 RX ADMIN — MIDAZOLAM 2 MG: 1 INJECTION INTRAMUSCULAR; INTRAVENOUS at 11:26

## 2024-07-17 RX ADMIN — LIDOCAINE HYDROCHLORIDE 60 MG: 20 INJECTION, SOLUTION EPIDURAL; INFILTRATION; INTRACAUDAL; PERINEURAL at 11:30

## 2024-07-17 RX ADMIN — HYDROMORPHONE HYDROCHLORIDE 1 MG: 1 INJECTION, SOLUTION INTRAMUSCULAR; INTRAVENOUS; SUBCUTANEOUS at 12:21

## 2024-07-17 RX ADMIN — ONDANSETRON 4 MG: 2 INJECTION INTRAMUSCULAR; INTRAVENOUS at 11:35

## 2024-07-17 RX ADMIN — PROPOFOL 100 MG: 10 INJECTION, EMULSION INTRAVENOUS at 11:30

## 2024-07-17 RX ADMIN — OXYCODONE HYDROCHLORIDE AND ACETAMINOPHEN 2 TABLET: 5; 325 TABLET ORAL at 14:02

## 2024-07-17 RX ADMIN — FENTANYL CITRATE 25 MCG: 0.05 INJECTION, SOLUTION INTRAMUSCULAR; INTRAVENOUS at 13:08

## 2024-07-17 RX ADMIN — SODIUM CHLORIDE 2000 MG: 900 INJECTION INTRAVENOUS at 11:35

## 2024-07-17 RX ADMIN — DEXAMETHASONE SODIUM PHOSPHATE 8 MG: 4 INJECTION, SOLUTION INTRAMUSCULAR; INTRAVENOUS at 11:35

## 2024-07-17 RX ADMIN — IPRATROPIUM BROMIDE AND ALBUTEROL SULFATE 1 DOSE: .5; 2.5 SOLUTION RESPIRATORY (INHALATION) at 12:57

## 2024-07-17 RX ADMIN — FENTANYL CITRATE 100 MCG: 50 INJECTION INTRAMUSCULAR; INTRAVENOUS at 11:26

## 2024-07-17 RX ADMIN — GLYCOPYRROLATE 0.2 MG: 0.2 INJECTION INTRAMUSCULAR; INTRAVENOUS at 11:35

## 2024-07-17 RX ADMIN — Medication 100 MCG: at 11:31

## 2024-07-17 ASSESSMENT — PAIN DESCRIPTION - FREQUENCY
FREQUENCY: CONTINUOUS

## 2024-07-17 ASSESSMENT — LIFESTYLE VARIABLES: SMOKING_STATUS: 1

## 2024-07-17 ASSESSMENT — PAIN SCALES - GENERAL
PAINLEVEL_OUTOF10: 6
PAINLEVEL_OUTOF10: 9
PAINLEVEL_OUTOF10: 6
PAINLEVEL_OUTOF10: 6

## 2024-07-17 ASSESSMENT — PAIN - FUNCTIONAL ASSESSMENT
PAIN_FUNCTIONAL_ASSESSMENT: PREVENTS OR INTERFERES SOME ACTIVE ACTIVITIES AND ADLS
PAIN_FUNCTIONAL_ASSESSMENT: 0-10
PAIN_FUNCTIONAL_ASSESSMENT: 0-10
PAIN_FUNCTIONAL_ASSESSMENT: PREVENTS OR INTERFERES SOME ACTIVE ACTIVITIES AND ADLS
PAIN_FUNCTIONAL_ASSESSMENT: PREVENTS OR INTERFERES SOME ACTIVE ACTIVITIES AND ADLS
PAIN_FUNCTIONAL_ASSESSMENT: ADULT NONVERBAL PAIN SCALE (NPVS)
PAIN_FUNCTIONAL_ASSESSMENT: 0-10

## 2024-07-17 ASSESSMENT — PAIN DESCRIPTION - ORIENTATION: ORIENTATION: RIGHT

## 2024-07-17 ASSESSMENT — PAIN DESCRIPTION - LOCATION
LOCATION: ABDOMEN

## 2024-07-17 ASSESSMENT — PAIN DESCRIPTION - ONSET
ONSET: ON-GOING

## 2024-07-17 ASSESSMENT — PAIN DESCRIPTION - DESCRIPTORS
DESCRIPTORS: ACHING
DESCRIPTORS: ACHING
DESCRIPTORS: DISCOMFORT
DESCRIPTORS: DISCOMFORT
DESCRIPTORS: ACHING
DESCRIPTORS: DISCOMFORT
DESCRIPTORS: ACHING

## 2024-07-17 ASSESSMENT — PAIN DESCRIPTION - PAIN TYPE
TYPE: SURGICAL PAIN

## 2024-07-17 ASSESSMENT — ENCOUNTER SYMPTOMS: SHORTNESS OF BREATH: 0

## 2024-07-17 NOTE — ANESTHESIA POSTPROCEDURE EVALUATION
Department of Anesthesiology  Postprocedure Note    Patient: Marcelo Hdez  MRN: [id number]  YOB: 1955  Date of evaluation: 7/17/2024    Procedure Summary       Date: 07/17/24 Room / Location: [address]    Anesthesia Start: 1126 Anesthesia Stop: 1238    Procedure: LAPAROSCOPIC CHOLECYSTECTOMY WITH CHOLANGIOGRAM (Abdomen) Diagnosis:       Cholecystitis      (Cholecystitis [K81.9])    Surgeons: Luis Mccartney MD Responsible Provider: Norberto Man MD    Anesthesia Type: General ASA Status: 3            Anesthesia Type: General    Matthew Phase I: Matthew Score: 8    Matthew Phase II: Matthew Score: 9    Anesthesia Post Evaluation    Patient location during evaluation: PACU  Patient participation: complete - patient participated  Level of consciousness: awake and alert  Pain score: 0  Airway patency: patent  Nausea & Vomiting: no nausea and no vomiting  Cardiovascular status: blood pressure returned to baseline  Respiratory status: acceptable  Hydration status: euvolemic  Pain management: adequate    No notable events documented.

## 2024-07-17 NOTE — PROGRESS NOTES
[facility] Pre-Admission Testing Electronic Communication Worksheet for OR/ENDO Procedures        Patient: Marcelo Hdez    DOS: 7/17    Transportation Confirmed: [x] YES    []  NO    History and Physical:  [x] YES -gibler will do dos- per Lolita      []  NO  [] N/A  If yes, please list doctor or Urgent Care and date of H&P:     Additional Clearance(Cardiac, Pulmonary, etc):  [] YES    [x]  NO    Pre-Admission Testing Visit:  [] YES    [x]  NO If no, do labs/testing need to be done DOS?  [] YES    []  NO    Medication Reconciliation Complete:  [x] YES    []  NO        Additional Notes:                Interview Complete: [x] YES    []  NO          Mily Bowers RN  4:23 PM  
02 sats on room air, 88-92%. IS initiated.   
Pt arrived to PACU from OR. Pt asleep on 3l/nc with oral airway in place. Abd soft. Incisions x 5 VILMA with surgical glue noted.   
Pt awake. Oral airway in place.   
Pt states ready to go home. Pt discharged in stable condition. Dr Man called about pts o2 sat and verbalized if pt states no issues then he can go home. Pt denies any sob or any other breathing issues. Pt used inhaler. Pt has IS to use at home. Also instructed on ap -verbalized understanding. Pt had juice and cookies. Pain 4/10 and tolerable with no nausea or other c/o. Wife viewed surgical sites.  
admission screening and protocol:       * Admitted with diarrhea?                         [] YES    [x]  NO     *Prior history of C-Diff. In last 3 months? [] YES    [x]  NO     *Antibiotic use in the past 6-8 weeks?      [x]  NO    []  YES                 If yes, which ANTIBIOTIC AND REASON______     *Prior hospitalization or nursing home in the last month? []  YES    [x]  NO        SAFETY FIRST..call before you fall

## 2024-07-17 NOTE — DISCHARGE INSTRUCTIONS
Luis Mccartney MD     General and Vascular Surgery   Heladio Lyons MD     [address] [facility]   Brenton Boss MD     [address]  [place] [address]        Phone: [phone]           Fax: [phone]                                                         POST-OPERATIVE INSTRUCTIONS FOR LAPAROSCOPIC CHOLECYSTECTOMY    Call the office to schedule your post-operative appointment with your surgeon for two (2) weeks.     You will have water occlusive glue closing your incisions.    You may shower.  Wash incisions gently, and pat them dry. Do not rub your incisions.  Do not soak incisions in tub baths, hot tubs or pools    General guidelines for activity:  Avoid strenuous activity or lifting anything heavier than 15 pounds for 4-6 weeks.   It is OK to be up walking around; walking up and down stairs is also OK.   Do what is comfortable: stop and rest when you feel tired.     Drink plenty of fluids and stay on a bland diet for 2-3 days after surgery.     Do NOT drive for one week and while taking your narcotic pain medicine.     Watch for signs of infection:   Fever over 100.5°   Excessive warmth or bright redness around your incisions  Leakage of bloody or cloudy fluid from your incisions    During the laparoscopic procedure that you had, gas is pumped into the abdominal cavity.  You may feel abdominal, shoulder, or rib pain for a few days due to this.    You will have pain medicine ordered. Take as directed.    If you experience constipation  Increase your water intake, and activity; walking is best.  A stool softener or mild laxative may be necessary if you still have not had a bowel  movement ; call the office for further instructions.

## 2024-07-17 NOTE — H&P
Update History & Physical    The patient's History and Physical from Raphael Walters PA-C on July 15, 2024 was reviewed with the patient and I examined the patient. There was no change. The surgical site was confirmed by the patient and me.       Plan: The risks, benefits, expected outcome, and alternative to the recommended procedure have been discussed with the patient. Patient understands and wants to proceed with the procedure.  Will proceed with laparoscopic cholecystectomy with cholangiogram, possible open.  Ancef ordered.  Bilateral SCDs.    Electronically signed by Luis Mccartney MD on 7/17/2024 at 11:27 AM

## 2024-07-17 NOTE — ANESTHESIA PRE PROCEDURE
Department of Anesthesiology  Preprocedure Note       Name:  Marcelo Hdez   Age:  68 y.o.  :  1955                                          MRN:  [id number]         Date:  2024      Surgeon: Surgeon(s):  Luis Mccartney MD    Procedure: Procedure(s):  LAPAROSCOPIC CHOLECYSTECTOMY WITH CHOLANGIOGRAM, POSSIBLE OPEN    Medications prior to admission:   Prior to Admission medications    Medication Sig Start Date End Date Taking? Authorizing Provider   oxyCODONE (ROXICODONE) 5 MG immediate release tablet Take 1 tablet by mouth every 8 hours as needed for Pain for up to 3 days. Intended supply: 3 days. Take lowest dose possible to manage pain Max Daily Amount: 15 mg 7/15/24 7/18/24  Olivia Aguilar PA   amoxicillin-clavulanate (AUGMENTIN) 875-125 MG per tablet Take 1 tablet by mouth 2 times daily for 5 days 7/15/24 7/20/24  Olivia Aguilar PA   docusate sodium (COLACE) 100 MG capsule Take 1 capsule by mouth 2 times daily (Stool softener)  Patient taking differently: Take 1 capsule by mouth 2 times daily as needed (Stool softener) 7/15/24   Olivia Aguilar PA   fluticasone-umeclidin-vilant (TRELEGY ELLIPTA) 200-62.5-25 MCG/ACT AEPB inhaler Inhale 1 puff into the lungs daily 24   Jackelin Luna APRN - CNP   predniSONE (DELTASONE) 10 MG tablet Take 1 tablet by mouth daily  Patient not taking: Reported on 2024   Neno Sevilla MD   montelukast (SINGULAIR) 10 MG tablet TAKE ONE TABLET BY MOUTH DAILY 24   John Dan MD   lisinopril-hydroCHLOROthiazide (PRINZIDE;ZESTORETIC) 20-25 MG per tablet Take 1 tablet by mouth daily 3/8/24   Demi Watts APRN - CNP   albuterol sulfate HFA (VENTOLIN HFA) 108 (90 Base) MCG/ACT inhaler Inhale 2 puffs into the lungs every 6 hours as needed for Wheezing    Provider, MD Lachelle   Ivermectin 1 % CREA Apply to face daily 23   Ni, Kellene, MD   fluticasone (FLONASE) 50 MCG/ACT nasal spray SPRAY TWO SPRAYS IN EACH NOSTRIL TWICE

## 2024-08-05 ENCOUNTER — OFFICE VISIT (OUTPATIENT)
Dept: SURGERY | Age: 69
End: 2024-08-05

## 2024-08-05 VITALS
HEART RATE: 73 BPM | SYSTOLIC BLOOD PRESSURE: 112 MMHG | DIASTOLIC BLOOD PRESSURE: 72 MMHG | BODY MASS INDEX: 27.47 KG/M2 | WEIGHT: 186 LBS

## 2024-08-05 DIAGNOSIS — K80.00 ACUTE CALCULOUS CHOLECYSTITIS: Primary | ICD-10-CM

## 2024-08-05 DIAGNOSIS — Z90.49 S/P LAPAROSCOPIC CHOLECYSTECTOMY: ICD-10-CM

## 2024-08-05 PROCEDURE — 99024 POSTOP FOLLOW-UP VISIT: CPT | Performed by: SURGERY

## 2024-08-05 NOTE — PROGRESS NOTES
Post Operative Visit    [facility]  [place] [facility]   Luis Mccartney MD    [address], [address]  [place], [place] [address]  Phone: [phone]  Fax: [phone]    Marcelo Hdez   YOB: 1955    Date of Visit:  8/5/2024    No ref. provider found  John Dan MD    HPI:     Gallbladder: Marcelo Hdez is 68 y.o. male presenting for his 2 week follow up visit after laparoscopic cholecystectomy, which was performed for ***.   Overall his pain has improved.  ***  He has finished his prescription of narcotic pain medication.  He is not taking any other medications for his pain.  He is *** eating and drinking without difficulty.  BMs are ***normal.  No jaundice.  No fevers or chills.         Vitals:    08/05/24 0945   BP: 112/72   Pulse: 73   Weight: 84.4 kg (186 lb)     Body mass index is 27.47 kg/m².       PHYSICAL EXAM:    CONSTITUTIONAL:  {ALERTNESS:229082177}, {LEVEL OF DISTRESS:105628792}  LUNGS:  Resp easy and unlabored  ABDOMEN:  Incisions {Desc; incision:47447}, no erythema or induration, {CONSISTENCY:785506063}, {DISTENTION:937577126}, {TENDERNESS:953983774}, {VOLUNTARY/INVOLUNTARY:026476313} guarding {PRESENT/ABSENT:565228495}, and {HERNIA:922752149}  MUSCULOSKELETAL: No edema  NEUROLOGIC:  Mental Status Exam:  Level of Alertness:   awake  Orientation:   person, place, time        Pathology:  ***    ASSESSMENT:    {No diagnosis found. (Refresh or delete this SmartLink)}      PLAN:    Overall Marcelo Hdez is doing well.  ***Abdominal pain has improved. He is tolerating a regular diet with normal bowel function.  Incisions are healing well.  Continue lifting restrictions for 4 more weeks.  ***Work excuse given.  ***Will have him follow up prn.      Electronically signed by Luis Mccartney MD on 8/5/2024 at 10:04 AM

## 2024-08-05 NOTE — PROGRESS NOTES
Post Operative Visit    [facility]  [place] [facility]   Luis Mccartney MD    [address], [address]  [place], [place] [address]  Phone: [phone]  Fax: [phone]    Marcelo Hdez   YOB: 1955    Date of Visit:  8/5/2024    No ref. provider found  John Dan MD    HPI:     Gallbladder: Marcelo Hdez is 68 y.o. male presenting for his 2 week follow up visit after laparoscopic cholecystectomy, which was performed for acute calculous cholecystitis.   Overall his pain has improved.  He is eating and drinking without difficulty but has decreased appetite.  BMs are normal.  He denies any diarrhea.  No jaundice.  No fevers or chills.         Vitals:    08/05/24 0945   BP: 112/72   Pulse: 73   Weight: 84.4 kg (186 lb)     Body mass index is 27.47 kg/m².       PHYSICAL EXAM:    CONSTITUTIONAL:  alert, no apparent distress  LUNGS:  Resp easy and unlabored  ABDOMEN:  Incisions healing well, no erythema or induration, soft, non-distended, non-tender, voluntary guarding absent, and hernia absent  MUSCULOSKELETAL: No edema  NEUROLOGIC:  Mental Status Exam:  Level of Alertness:   awake  Orientation:   person, place, time        Pathology:  FINAL DIAGNOSIS:     Gallbladder, cholecystectomy:   -  Chronic cholecystitis.   -  Cholelithiasis.     BARJA/BARJA     ASSESSMENT:     Diagnosis Orders   1. Acute calculous cholecystitis        2. S/P laparoscopic cholecystectomy              PLAN:    Overall Marcelo Hdez is doing well.  Abdominal pain has improved. He is tolerating a regular diet with normal bowel function.  I recommended to increase his PPI to BID for his reflux.  Incisions are healing well.  Continue lifting restrictions for 1 more week.  Work excuse given.  Will have him follow up prn.      Electronically signed by Luis Mccartney MD on 8/5/2024 at 10:06 AM

## 2024-08-09 ENCOUNTER — TELEPHONE (OUTPATIENT)
Dept: CASE MANAGEMENT | Age: 69
End: 2024-08-09

## 2024-08-28 ENCOUNTER — PATIENT MESSAGE (OUTPATIENT)
Dept: FAMILY MEDICINE CLINIC | Age: 69
End: 2024-08-28

## 2024-08-28 DIAGNOSIS — K21.9 GASTROESOPHAGEAL REFLUX DISEASE, UNSPECIFIED WHETHER ESOPHAGITIS PRESENT: ICD-10-CM

## 2024-08-28 RX ORDER — OMEPRAZOLE 40 MG/1
40 CAPSULE, DELAYED RELEASE ORAL DAILY
Qty: 90 CAPSULE | Refills: 3 | Status: SHIPPED | OUTPATIENT
Start: 2024-08-28

## 2024-09-12 DIAGNOSIS — I10 ESSENTIAL HYPERTENSION: ICD-10-CM

## 2024-09-13 RX ORDER — LISINOPRIL AND HYDROCHLOROTHIAZIDE 20; 25 MG/1; MG/1
1 TABLET ORAL DAILY
Qty: 90 TABLET | Refills: 0 | Status: SHIPPED | OUTPATIENT
Start: 2024-09-13

## 2024-11-02 ENCOUNTER — TELEPHONE (OUTPATIENT)
Dept: CASE MANAGEMENT | Age: 69
End: 2024-11-02

## 2024-11-15 DIAGNOSIS — J41.0 SIMPLE CHRONIC BRONCHITIS (HCC): ICD-10-CM

## 2024-11-15 RX ORDER — MONTELUKAST SODIUM 10 MG/1
TABLET ORAL
Qty: 90 TABLET | Refills: 1 | Status: SHIPPED | OUTPATIENT
Start: 2024-11-15

## 2024-11-18 DIAGNOSIS — J41.0 SIMPLE CHRONIC BRONCHITIS (HCC): ICD-10-CM

## 2024-11-18 RX ORDER — MONTELUKAST SODIUM 10 MG/1
TABLET ORAL
Qty: 90 TABLET | Refills: 1 | OUTPATIENT
Start: 2024-11-18

## 2024-11-22 ENCOUNTER — OFFICE VISIT (OUTPATIENT)
Dept: FAMILY MEDICINE CLINIC | Age: 69
End: 2024-11-22

## 2024-11-22 VITALS
DIASTOLIC BLOOD PRESSURE: 68 MMHG | OXYGEN SATURATION: 95 % | HEART RATE: 62 BPM | SYSTOLIC BLOOD PRESSURE: 112 MMHG | BODY MASS INDEX: 29.03 KG/M2 | WEIGHT: 196 LBS | RESPIRATION RATE: 16 BRPM | HEIGHT: 69 IN

## 2024-11-22 DIAGNOSIS — K21.9 GASTROESOPHAGEAL REFLUX DISEASE, UNSPECIFIED WHETHER ESOPHAGITIS PRESENT: ICD-10-CM

## 2024-11-22 DIAGNOSIS — R73.03 PREDIABETES: ICD-10-CM

## 2024-11-22 DIAGNOSIS — I10 ESSENTIAL HYPERTENSION: ICD-10-CM

## 2024-11-22 DIAGNOSIS — L71.1 RHINOPHYMA: Primary | ICD-10-CM

## 2024-11-22 DIAGNOSIS — Z87.891 PERSONAL HISTORY OF TOBACCO USE: ICD-10-CM

## 2024-11-22 LAB — HBA1C MFR BLD: 6 %

## 2024-11-22 RX ORDER — LISINOPRIL AND HYDROCHLOROTHIAZIDE 20; 25 MG/1; MG/1
1 TABLET ORAL DAILY
Qty: 90 TABLET | Refills: 0 | Status: SHIPPED | OUTPATIENT
Start: 2024-11-22

## 2024-11-22 NOTE — PROGRESS NOTES
CHRONIC CONDITION FOLLOW-UP     Assessment and Plan:      Diagnosis Orders   1. Rhinophyma        2. Essential hypertension  lisinopril-hydroCHLOROthiazide (PRINZIDE;ZESTORETIC) 20-25 MG per tablet      3. Gastroesophageal reflux disease, unspecified whether esophagitis present  omeprazole (PRILOSEC) 20 MG delayed release capsule      4. Prediabetes  POCT glycosylated hemoglobin (Hb A1C)      5. Personal history of tobacco use  PA VISIT TO DISCUSS LUNG CA SCREEN W LDCT    CT Lung Screen (Initial/Annual/Baseline)        Plan as above and below.     Continue current Tx plan. Any changes marked below.    INSTRUCTIONS  NEXT APPOINTMENT: Please schedule fasting annual physical (30 minutes) in 6 months.  OK to have water, black coffee and medications (except for diabetes medicines)   Please get annual flu soon.  Can get at stores such as [employer].  DECREASE omperazole to 20 mg for awhile and then try off or just as needed.  Medicare part D patients:  Get Shingrix shingles vaccine at pharmacy (such as [facility] or [facility]). Need second dose in 2-6 months. Medicare starts covering it in 2023.  RSV (Respiratory syncytial virus) causes colds but can also cause viral pneumonia which cannot be treated with antibiotics. Those at risk of more severe infection are the very young and those over 60 that have chronic medical conditions. Medicare coverage improving at this time at the pharmacy. If not covered, check again next January.  Get CT lung scan to screen for lung cancer.  AND quit smoking!    Subjective:      Chief Complaint   Patient presents with    Medication Check     Marcelo Hdez is an 69 y.o. male who presents for follow up    Complaints:   Doing well S/P brittani in July.  No GERD in a long time.    CHART REVIEW  Health Maintenance Due   Topic Date Due    Shingles vaccine (2 of 3) 09/10/2015    Respiratory Syncytial Virus (RSV) Pregnant or age 60 yrs+ (1 - Risk 60-74 years 1-dose series) Never done    Flu

## 2024-11-22 NOTE — PATIENT INSTRUCTIONS
Screening.\"  Current as of: October 25, 2023  Content Version: 14.2  © 2024 [facility] [employer].   Care instructions adapted under license by [employer]. If you have questions about a medical condition or this instruction, always ask your healthcare professional. [employer] disclaims any warranty or liability for your use of this information.

## 2024-12-16 ENCOUNTER — OFFICE VISIT (OUTPATIENT)
Dept: PULMONOLOGY | Age: 69
End: 2024-12-16
Payer: MEDICARE

## 2024-12-16 ENCOUNTER — HOSPITAL ENCOUNTER (OUTPATIENT)
Dept: CT IMAGING | Age: 69
Discharge: HOME | End: 2024-12-16
Attending: FAMILY MEDICINE
Payer: MEDICARE

## 2024-12-16 VITALS
BODY MASS INDEX: 29.38 KG/M2 | OXYGEN SATURATION: 95 % | SYSTOLIC BLOOD PRESSURE: 110 MMHG | HEIGHT: 69 IN | TEMPERATURE: 97.4 F | DIASTOLIC BLOOD PRESSURE: 70 MMHG | HEART RATE: 54 BPM | RESPIRATION RATE: 16 BRPM | WEIGHT: 198.4 LBS

## 2024-12-16 DIAGNOSIS — K21.9 GASTROESOPHAGEAL REFLUX DISEASE WITHOUT ESOPHAGITIS: ICD-10-CM

## 2024-12-16 DIAGNOSIS — J45.40 MODERATE PERSISTENT ASTHMA WITHOUT COMPLICATION: Primary | ICD-10-CM

## 2024-12-16 DIAGNOSIS — Z87.891 PERSONAL HISTORY OF TOBACCO USE: ICD-10-CM

## 2024-12-16 DIAGNOSIS — J31.0 CHRONIC RHINITIS: ICD-10-CM

## 2024-12-16 PROCEDURE — 1036F TOBACCO NON-USER: CPT | Performed by: INTERNAL MEDICINE

## 2024-12-16 PROCEDURE — 1159F MED LIST DOCD IN RCRD: CPT | Performed by: INTERNAL MEDICINE

## 2024-12-16 PROCEDURE — G8417 CALC BMI ABV UP PARAM F/U: HCPCS | Performed by: INTERNAL MEDICINE

## 2024-12-16 PROCEDURE — 99214 OFFICE O/P EST MOD 30 MIN: CPT | Performed by: INTERNAL MEDICINE

## 2024-12-16 PROCEDURE — 71271 CT THORAX LUNG CANCER SCR C-: CPT

## 2024-12-16 PROCEDURE — G2211 COMPLEX E/M VISIT ADD ON: HCPCS | Performed by: INTERNAL MEDICINE

## 2024-12-16 PROCEDURE — 3017F COLORECTAL CA SCREEN DOC REV: CPT | Performed by: INTERNAL MEDICINE

## 2024-12-16 PROCEDURE — 6360000004 HC RX CONTRAST MEDICATION: Performed by: FAMILY MEDICINE

## 2024-12-16 PROCEDURE — 3074F SYST BP LT 130 MM HG: CPT | Performed by: INTERNAL MEDICINE

## 2024-12-16 PROCEDURE — 1123F ACP DISCUSS/DSCN MKR DOCD: CPT | Performed by: INTERNAL MEDICINE

## 2024-12-16 PROCEDURE — G8427 DOCREV CUR MEDS BY ELIG CLIN: HCPCS | Performed by: INTERNAL MEDICINE

## 2024-12-16 PROCEDURE — G8484 FLU IMMUNIZE NO ADMIN: HCPCS | Performed by: INTERNAL MEDICINE

## 2024-12-16 PROCEDURE — 3078F DIAST BP <80 MM HG: CPT | Performed by: INTERNAL MEDICINE

## 2024-12-16 RX ORDER — IOPAMIDOL 755 MG/ML
75 INJECTION, SOLUTION INTRAVASCULAR
Status: COMPLETED | OUTPATIENT
Start: 2024-12-16 | End: 2024-12-16

## 2024-12-16 RX ORDER — FLUTICASONE FUROATE, UMECLIDINIUM BROMIDE AND VILANTEROL TRIFENATATE 200; 62.5; 25 UG/1; UG/1; UG/1
1 POWDER RESPIRATORY (INHALATION) DAILY
Qty: 2 EACH | Refills: 0 | Status: SHIPPED | COMMUNITY
Start: 2024-12-16

## 2024-12-16 RX ADMIN — IOPAMIDOL 75 ML: 755 INJECTION, SOLUTION INTRAVENOUS at 14:14

## 2024-12-16 NOTE — PROGRESS NOTES
Pulmonary Progress           REASON FOR CONSULTATION:  Chief Complaint   Patient presents with    Asthma    Follow-up        Consult at request of John Dan MD     PCP: John Dan MD        Assessment and Plan:   Diagnosis Orders   1. Moderate persistent asthma without complication        2. Chronic rhinitis        3. Gastroesophageal reflux disease without esophagitis                Plan:  Having issue affording maintains inhalers, will provide samples  Ct screening   Continue Duoneb as needed.   Intranasal steroid and antihistamine.  Strict antireflux precautions and PPI.      HISTORY OF PRESENT ILLNESS: Marcelo Hdez is very pleasant 69 y.o. year old gentleman with medical history stated below significant for former smoker quit in 2020, severe persistent asthma with recent exacerbation, treated with steroid and anti allergic meds.  He has a pet dog.  No mold in his house.    Here for follow up   Has been doing well   No recent flare up   Do for ct screening       Past Medical History:   Diagnosis Date    GERD (gastroesophageal reflux disease)     Hypercholesteremia     Hyperlipidemia LDL goal <130 06/16/2011    Hypertension     Tobacco abuse     Vitamin D deficiency     Wears dentures        Past Surgical History:   Procedure Laterality Date    CHOLECYSTECTOMY, LAPAROSCOPIC N/A 7/17/2024    LAPAROSCOPIC CHOLECYSTECTOMY WITH CHOLANGIOGRAM performed by Luis Mccartney MD at [facility] OR       family history includes Cancer in his mother; Heart Disease in his sister; Heart Failure in his brother and father; Heart Surgery in his sister; Hypertension in his father and sister; Lung Cancer (age of onset: 63) in his brother.      SOCIAL HISTORY:   reports that he quit smoking about 4 years ago. His smoking use included cigarettes. He started smoking about 61 years ago. He has a 28.2 pack-year smoking history.

## 2024-12-17 ENCOUNTER — TELEPHONE (OUTPATIENT)
Dept: FAMILY MEDICINE CLINIC | Age: 69
End: 2024-12-17

## 2024-12-17 NOTE — TELEPHONE ENCOUNTER
J CARLOS is calling returning a call to Dimple regarding her 's CT Scan results    Please call wife back, she is on HIPAA

## 2025-03-24 DIAGNOSIS — I10 ESSENTIAL HYPERTENSION: ICD-10-CM

## 2025-03-24 RX ORDER — LISINOPRIL AND HYDROCHLOROTHIAZIDE 20; 25 MG/1; MG/1
1 TABLET ORAL DAILY
Qty: 90 TABLET | Refills: 1 | Status: SHIPPED | OUTPATIENT
Start: 2025-03-24

## 2025-05-19 ENCOUNTER — OFFICE VISIT (OUTPATIENT)
Dept: FAMILY MEDICINE CLINIC | Age: 70
End: 2025-05-19
Payer: MEDICARE

## 2025-05-19 ENCOUNTER — HOSPITAL ENCOUNTER (OUTPATIENT)
Dept: CT IMAGING | Age: 70
Discharge: HOME | End: 2025-05-19
Payer: MEDICARE

## 2025-05-19 VITALS
BODY MASS INDEX: 28.14 KG/M2 | TEMPERATURE: 98.6 F | OXYGEN SATURATION: 92 % | HEIGHT: 69 IN | WEIGHT: 190 LBS | SYSTOLIC BLOOD PRESSURE: 106 MMHG | RESPIRATION RATE: 18 BRPM | HEART RATE: 75 BPM | DIASTOLIC BLOOD PRESSURE: 60 MMHG

## 2025-05-19 DIAGNOSIS — K57.92 DIVERTICULITIS: ICD-10-CM

## 2025-05-19 DIAGNOSIS — R10.32 LLQ PAIN: ICD-10-CM

## 2025-05-19 DIAGNOSIS — R10.32 LLQ PAIN: Primary | ICD-10-CM

## 2025-05-19 PROCEDURE — 74177 CT ABD & PELVIS W/CONTRAST: CPT

## 2025-05-19 PROCEDURE — G8417 CALC BMI ABV UP PARAM F/U: HCPCS

## 2025-05-19 PROCEDURE — 6360000004 HC RX CONTRAST MEDICATION

## 2025-05-19 PROCEDURE — 1123F ACP DISCUSS/DSCN MKR DOCD: CPT

## 2025-05-19 PROCEDURE — 1159F MED LIST DOCD IN RCRD: CPT

## 2025-05-19 PROCEDURE — 99214 OFFICE O/P EST MOD 30 MIN: CPT

## 2025-05-19 PROCEDURE — 3074F SYST BP LT 130 MM HG: CPT

## 2025-05-19 PROCEDURE — 1160F RVW MEDS BY RX/DR IN RCRD: CPT

## 2025-05-19 PROCEDURE — 3017F COLORECTAL CA SCREEN DOC REV: CPT

## 2025-05-19 PROCEDURE — 1036F TOBACCO NON-USER: CPT

## 2025-05-19 PROCEDURE — G8427 DOCREV CUR MEDS BY ELIG CLIN: HCPCS

## 2025-05-19 PROCEDURE — G2211 COMPLEX E/M VISIT ADD ON: HCPCS

## 2025-05-19 PROCEDURE — 3078F DIAST BP <80 MM HG: CPT

## 2025-05-19 RX ORDER — METRONIDAZOLE 500 MG/1
500 TABLET ORAL 2 TIMES DAILY
Qty: 20 TABLET | Refills: 0 | Status: SHIPPED | OUTPATIENT
Start: 2025-05-19 | End: 2025-05-29

## 2025-05-19 RX ORDER — IOPAMIDOL 612 MG/ML
50 INJECTION, SOLUTION INTRAVASCULAR
Status: COMPLETED | OUTPATIENT
Start: 2025-05-19 | End: 2025-05-19

## 2025-05-19 RX ORDER — IOPAMIDOL 755 MG/ML
75 INJECTION, SOLUTION INTRAVASCULAR
Status: DISCONTINUED | OUTPATIENT
Start: 2025-05-19 | End: 2025-05-20 | Stop reason: HOSPADM

## 2025-05-19 RX ORDER — CIPROFLOXACIN 500 MG/1
500 TABLET, FILM COATED ORAL 2 TIMES DAILY
Qty: 20 TABLET | Refills: 0 | Status: SHIPPED | OUTPATIENT
Start: 2025-05-19 | End: 2025-05-29

## 2025-05-19 RX ADMIN — IOPAMIDOL 50 ML: 612 INJECTION, SOLUTION INTRAVENOUS at 11:32

## 2025-05-19 SDOH — ECONOMIC STABILITY: FOOD INSECURITY: WITHIN THE PAST 12 MONTHS, YOU WORRIED THAT YOUR FOOD WOULD RUN OUT BEFORE YOU GOT MONEY TO BUY MORE.: NEVER TRUE

## 2025-05-19 SDOH — ECONOMIC STABILITY: INCOME INSECURITY: IN THE LAST 12 MONTHS, WAS THERE A TIME WHEN YOU WERE NOT ABLE TO PAY THE MORTGAGE OR RENT ON TIME?: NO

## 2025-05-19 SDOH — ECONOMIC STABILITY: TRANSPORTATION INSECURITY
IN THE PAST 12 MONTHS, HAS THE LACK OF TRANSPORTATION KEPT YOU FROM MEDICAL APPOINTMENTS OR FROM GETTING MEDICATIONS?: NO

## 2025-05-19 SDOH — ECONOMIC STABILITY: FOOD INSECURITY: WITHIN THE PAST 12 MONTHS, THE FOOD YOU BOUGHT JUST DIDN'T LAST AND YOU DIDN'T HAVE MONEY TO GET MORE.: NEVER TRUE

## 2025-05-19 ASSESSMENT — PATIENT HEALTH QUESTIONNAIRE - PHQ9
SUM OF ALL RESPONSES TO PHQ QUESTIONS 1-9: 0
SUM OF ALL RESPONSES TO PHQ QUESTIONS 1-9: 0
1. LITTLE INTEREST OR PLEASURE IN DOING THINGS: NOT AT ALL
SUM OF ALL RESPONSES TO PHQ QUESTIONS 1-9: 0
2. FEELING DOWN, DEPRESSED OR HOPELESS: NOT AT ALL
SUM OF ALL RESPONSES TO PHQ QUESTIONS 1-9: 0
SUM OF ALL RESPONSES TO PHQ9 QUESTIONS 1 & 2: 0
1. LITTLE INTEREST OR PLEASURE IN DOING THINGS: NOT AT ALL
2. FEELING DOWN, DEPRESSED OR HOPELESS: NOT AT ALL

## 2025-05-19 ASSESSMENT — ENCOUNTER SYMPTOMS
VOMITING: 0
NAUSEA: 0
ABDOMINAL PAIN: 1
BLOOD IN STOOL: 0
ABDOMINAL DISTENTION: 0
DIARRHEA: 0
SHORTNESS OF BREATH: 0
RECTAL PAIN: 0
CONSTIPATION: 1

## 2025-05-19 NOTE — PROGRESS NOTES
5/19/2025    This is a 69 y.o. male   Chief Complaint   Patient presents with    Abdominal Cramping     X2 weeks.  LLQ pain.  Low back pain.  Has been constipated.  Mouth is extremely dry.  Low energy.    .    HPI    Macario is here with complaints of 2 weeks of LLQ abdominal pain  He describes the pain as aching/cramping  He does not feel that it is better or worse with food intake  He denies n/v/d or urinary symptoms  He has not had any fevers  He does feel that he has been having BMs less frequently  He has low energy and feels that his mouth has been dry  Of note, he has not been drinking or eating as much due to the abdominal pain    His last colonoscopy was in 2018 and was notable for sigmoid diverticulosis.  He has never had a diverticulitis flare that he is aware of.     Patient Active Problem List   Diagnosis    Vitamin D deficiency    Hyperlipidemia LDL goal <130    GERD (gastroesophageal reflux disease)- consider decrease PPI?    Essential hypertension    Insomnia    Allergic rhinitis    Pulmonary nodule less than 1 cm in diameter with low risk for malignant neoplasm, plan repeat LDCT August 2024    Simple chronic bronchitis (HCC)    Moderate persistent asthma without complication    Prediabetes- HgbA1c 5.8% 2/2023    Cholecystitis    Rhinophyma       Current Outpatient Medications   Medication Sig Dispense Refill    ciprofloxacin (CIPRO) 500 MG tablet Take 1 tablet by mouth 2 times daily for 10 days 20 tablet 0    metroNIDAZOLE (FLAGYL) 500 MG tablet Take 1 tablet by mouth 2 times daily for 10 days 20 tablet 0    lisinopril-hydroCHLOROthiazide (PRINZIDE;ZESTORETIC) 20-25 MG per tablet TAKE 1 TABLET BY MOUTH DAILY 90 tablet 1    fluticasone-umeclidin-vilant (TRELEGY ELLIPTA) 200-62.5-25 MCG/ACT AEPB inhaler Inhale 1 puff into the lungs daily 2 each 0    omeprazole (PRILOSEC) 20 MG delayed release capsule Take 2 capsules by mouth daily 90 capsule 3    montelukast (SINGULAIR) 10 MG tablet TAKE 1 TABLET BY

## 2025-05-20 ENCOUNTER — RESULTS FOLLOW-UP (OUTPATIENT)
Dept: FAMILY MEDICINE CLINIC | Age: 70
End: 2025-05-20

## 2025-05-20 DIAGNOSIS — R93.89 ABNORMAL FINDING ON IMAGING: Primary | ICD-10-CM

## 2025-05-20 LAB
ALBUMIN SERPL-MCNC: 4.2 G/DL (ref 3.4–5)
ALBUMIN/GLOB SERPL: 1.7 {RATIO} (ref 1.1–2.2)
ALP SERPL-CCNC: 81 U/L (ref 40–129)
ALT SERPL-CCNC: 19 U/L (ref 10–40)
ANION GAP SERPL CALCULATED.3IONS-SCNC: 11 MMOL/L (ref 3–16)
AST SERPL-CCNC: 18 U/L (ref 15–37)
BASOPHILS # BLD: 0 K/UL (ref 0–0.2)
BASOPHILS NFR BLD: 0.5 %
BILIRUB SERPL-MCNC: 1.2 MG/DL (ref 0–1)
BUN SERPL-MCNC: 20 MG/DL (ref 7–20)
CALCIUM SERPL-MCNC: 9 MG/DL (ref 8.3–10.6)
CHLORIDE SERPL-SCNC: 104 MMOL/L (ref 99–110)
CO2 SERPL-SCNC: 27 MMOL/L (ref 21–32)
CREAT SERPL-MCNC: 1 MG/DL (ref 0.8–1.3)
DEPRECATED RDW RBC AUTO: 13.2 % (ref 12.4–15.4)
EOSINOPHIL # BLD: 0.1 K/UL (ref 0–0.6)
EOSINOPHIL NFR BLD: 1.2 %
GFR SERPLBLD CREATININE-BSD FMLA CKD-EPI: 81 ML/MIN/{1.73_M2}
GLUCOSE SERPL-MCNC: 102 MG/DL (ref 70–99)
HCT VFR BLD AUTO: 43 % (ref 40.5–52.5)
HGB BLD-MCNC: 15.1 G/DL (ref 13.5–17.5)
LYMPHOCYTES # BLD: 2.3 K/UL (ref 1–5.1)
LYMPHOCYTES NFR BLD: 21.5 %
MCH RBC QN AUTO: 31.5 PG (ref 26–34)
MCHC RBC AUTO-ENTMCNC: 35 G/DL (ref 31–36)
MCV RBC AUTO: 89.9 FL (ref 80–100)
MONOCYTES # BLD: 0.9 K/UL (ref 0–1.3)
MONOCYTES NFR BLD: 8 %
NEUTROPHILS # BLD: 7.4 K/UL (ref 1.7–7.7)
NEUTROPHILS NFR BLD: 68.8 %
PLATELET # BLD AUTO: 212 K/UL (ref 135–450)
PMV BLD AUTO: 8.9 FL (ref 5–10.5)
POTASSIUM SERPL-SCNC: 4.1 MMOL/L (ref 3.5–5.1)
PROT SERPL-MCNC: 6.7 G/DL (ref 6.4–8.2)
RBC # BLD AUTO: 4.78 M/UL (ref 4.2–5.9)
SODIUM SERPL-SCNC: 142 MMOL/L (ref 136–145)
WBC # BLD AUTO: 10.8 K/UL (ref 4–11)

## 2025-05-21 ENCOUNTER — TELEPHONE (OUTPATIENT)
Dept: FAMILY MEDICINE CLINIC | Age: 70
End: 2025-05-21

## 2025-05-21 DIAGNOSIS — R93.89 ABNORMAL RADIOGRAPHIC EXAMINATION: Primary | ICD-10-CM

## 2025-05-21 DIAGNOSIS — R91.1 PULMONARY NODULE LESS THAN 1 CM IN DIAMETER WITH LOW RISK FOR MALIGNANT NEOPLASM: Primary | ICD-10-CM

## 2025-05-21 DIAGNOSIS — Z91.89 PULMONARY NODULE LESS THAN 1 CM IN DIAMETER WITH LOW RISK FOR MALIGNANT NEOPLASM: Primary | ICD-10-CM

## 2025-05-21 NOTE — TELEPHONE ENCOUNTER
Aby from [facility] called stating that the CT Chest order for this patient needs to be WITH CONTRAST not with/without     Please Advise

## 2025-05-22 DIAGNOSIS — J41.0 SIMPLE CHRONIC BRONCHITIS (HCC): ICD-10-CM

## 2025-05-23 RX ORDER — MONTELUKAST SODIUM 10 MG/1
TABLET ORAL
Qty: 90 TABLET | Refills: 1 | Status: SHIPPED | OUTPATIENT
Start: 2025-05-23

## 2025-05-29 ENCOUNTER — HOSPITAL ENCOUNTER (OUTPATIENT)
Dept: CT IMAGING | Age: 70
Discharge: HOME | End: 2025-05-29
Attending: FAMILY MEDICINE
Payer: MEDICARE

## 2025-05-29 ENCOUNTER — RESULTS FOLLOW-UP (OUTPATIENT)
Dept: FAMILY MEDICINE CLINIC | Age: 70
End: 2025-05-29

## 2025-05-29 DIAGNOSIS — R93.89 ABNORMAL RADIOGRAPHIC EXAMINATION: ICD-10-CM

## 2025-05-29 PROCEDURE — 71260 CT THORAX DX C+: CPT

## 2025-05-29 PROCEDURE — 6360000004 HC RX CONTRAST MEDICATION: Performed by: FAMILY MEDICINE

## 2025-05-29 RX ORDER — IOPAMIDOL 755 MG/ML
75 INJECTION, SOLUTION INTRAVASCULAR
Status: COMPLETED | OUTPATIENT
Start: 2025-05-29 | End: 2025-05-29

## 2025-05-29 RX ADMIN — IOPAMIDOL 75 ML: 755 INJECTION, SOLUTION INTRAVENOUS at 07:16

## 2025-05-30 ENCOUNTER — OFFICE VISIT (OUTPATIENT)
Dept: PULMONOLOGY | Age: 70
End: 2025-05-30
Payer: MEDICARE

## 2025-05-30 ENCOUNTER — OFFICE VISIT (OUTPATIENT)
Dept: FAMILY MEDICINE CLINIC | Age: 70
End: 2025-05-30

## 2025-05-30 VITALS
SYSTOLIC BLOOD PRESSURE: 106 MMHG | OXYGEN SATURATION: 88 % | RESPIRATION RATE: 16 BRPM | HEIGHT: 69 IN | DIASTOLIC BLOOD PRESSURE: 70 MMHG | BODY MASS INDEX: 27.85 KG/M2 | WEIGHT: 188 LBS | HEART RATE: 60 BPM

## 2025-05-30 VITALS
WEIGHT: 188 LBS | HEIGHT: 69 IN | TEMPERATURE: 98.6 F | SYSTOLIC BLOOD PRESSURE: 106 MMHG | DIASTOLIC BLOOD PRESSURE: 70 MMHG | RESPIRATION RATE: 18 BRPM | OXYGEN SATURATION: 94 % | HEART RATE: 57 BPM | BODY MASS INDEX: 27.85 KG/M2

## 2025-05-30 DIAGNOSIS — R09.02 HYPOXEMIA: ICD-10-CM

## 2025-05-30 DIAGNOSIS — R59.0 ADENOPATHY, HILAR: ICD-10-CM

## 2025-05-30 DIAGNOSIS — R59.0 MEDIASTINAL ADENOPATHY: ICD-10-CM

## 2025-05-30 DIAGNOSIS — J45.40 MODERATE PERSISTENT ASTHMA WITHOUT COMPLICATION: ICD-10-CM

## 2025-05-30 DIAGNOSIS — K57.92 DIVERTICULITIS: Primary | ICD-10-CM

## 2025-05-30 DIAGNOSIS — J45.40 MODERATE PERSISTENT ASTHMA WITHOUT COMPLICATION: Primary | ICD-10-CM

## 2025-05-30 DIAGNOSIS — R05.9 COUGH IN ADULT: ICD-10-CM

## 2025-05-30 DIAGNOSIS — K21.9 GASTROESOPHAGEAL REFLUX DISEASE, UNSPECIFIED WHETHER ESOPHAGITIS PRESENT: ICD-10-CM

## 2025-05-30 DIAGNOSIS — J41.0 SIMPLE CHRONIC BRONCHITIS (HCC): ICD-10-CM

## 2025-05-30 PROCEDURE — 3074F SYST BP LT 130 MM HG: CPT | Performed by: INTERNAL MEDICINE

## 2025-05-30 PROCEDURE — 1159F MED LIST DOCD IN RCRD: CPT | Performed by: INTERNAL MEDICINE

## 2025-05-30 PROCEDURE — 1123F ACP DISCUSS/DSCN MKR DOCD: CPT | Performed by: INTERNAL MEDICINE

## 2025-05-30 PROCEDURE — 3078F DIAST BP <80 MM HG: CPT | Performed by: INTERNAL MEDICINE

## 2025-05-30 PROCEDURE — 1160F RVW MEDS BY RX/DR IN RCRD: CPT | Performed by: INTERNAL MEDICINE

## 2025-05-30 PROCEDURE — 99214 OFFICE O/P EST MOD 30 MIN: CPT | Performed by: INTERNAL MEDICINE

## 2025-05-30 PROCEDURE — G8427 DOCREV CUR MEDS BY ELIG CLIN: HCPCS | Performed by: INTERNAL MEDICINE

## 2025-05-30 PROCEDURE — G8417 CALC BMI ABV UP PARAM F/U: HCPCS | Performed by: INTERNAL MEDICINE

## 2025-05-30 PROCEDURE — 3017F COLORECTAL CA SCREEN DOC REV: CPT | Performed by: INTERNAL MEDICINE

## 2025-05-30 PROCEDURE — 1036F TOBACCO NON-USER: CPT | Performed by: INTERNAL MEDICINE

## 2025-05-30 RX ORDER — BENZONATATE 100 MG/1
100 CAPSULE ORAL 3 TIMES DAILY PRN
Qty: 90 CAPSULE | Refills: 0 | Status: SHIPPED | OUTPATIENT
Start: 2025-05-30 | End: 2025-06-29

## 2025-05-30 RX ORDER — OMEPRAZOLE 40 MG/1
40 CAPSULE, DELAYED RELEASE ORAL DAILY
Qty: 90 CAPSULE | Refills: 1 | Status: SHIPPED | OUTPATIENT
Start: 2025-05-30

## 2025-05-30 ASSESSMENT — ENCOUNTER SYMPTOMS
ABDOMINAL PAIN: 0
DIARRHEA: 0
VOMITING: 0
COUGH: 1
SHORTNESS OF BREATH: 0
WHEEZING: 1
WHEEZING: 0
COLOR CHANGE: 0
NAUSEA: 0
COUGH: 1
SHORTNESS OF BREATH: 1
CHEST TIGHTNESS: 1

## 2025-05-30 NOTE — ASSESSMENT & PLAN NOTE
New, uncertain prognosis, large level 7 lymph node appears necrotic.  This is new from December.  Discussed with primary pulmonologist Dr. Pantoja he is planning to see the patient back in a month and discuss surveillance scans at that time.

## 2025-05-30 NOTE — ASSESSMENT & PLAN NOTE
Chronic, not at goal (unstable), changes made today: Add Tessalon Perles.  Continue bronchodilator therapy, medication adherence emphasized, and lifestyle modifications recommended

## 2025-05-30 NOTE — PROGRESS NOTES
Dr. Gold this morning  Adenopathy, hilar  NEW  Noted on recent chest CT  Will have him follow up with pulmonology for further plan  Moderate persistent asthma without complication  Chronic- uncontrolled  Has not been consistent with maintenance inhalers  Managed per pulmonology- he is able to get in with Dr. Gold today  Cough in adult  Chronic- worse  Likely due to chronic pulmonary disease  Has not been consistent with maintenance inhalers  Managed per pulmonology- he is able to get in with Dr. Gold today      Return in about 2 months (around 7/30/2025).

## 2025-05-30 NOTE — PROGRESS NOTES
[facility] PULMONARY, SLEEP, AND CRITICAL CARE    Marcelo Hdez (:  1955) is a 69 y.o. male,Established patient, here for evaluation of the following chief complaint(s):  Cough and Asthma      Assessment & Plan   ASSESSMENT/PLAN:  1. Moderate persistent asthma without complication  Assessment & Plan:  Chronic, not at goal (unstable), changes made today: Add Tessalon Perles.  Continue bronchodilator therapy, medication adherence emphasized, and lifestyle modifications recommended  Orders:  -     benzonatate (TESSALON PERLES) 100 MG capsule; Take 1 capsule by mouth 3 times daily as needed for Cough, Disp-90 capsule, R-0Normal  2. Mediastinal adenopathy  Assessment & Plan:   New, uncertain prognosis, large level 7 lymph node appears necrotic.  This is new from December.  Discussed with primary pulmonologist Dr. Pantoja he is planning to see the patient back in a month and discuss surveillance scans at that time.  - Previously seen parenchymal nodules appear grossly stable.      No follow-ups on file.  Future Appointments   Date Time Provider Department Center   2025  8:20 AM Neno Sevilla MD  PULM [facility]            Subjective   SUBJECTIVE/OBJECTIVE:  69-year-old with history of asthma presents for sick visit for hypoxia.  Was in PCPs office this morning with reported hypoxemia.  During my visit patient is asymptomatic his O2 sat is 94% but he is having significant amount of coughing.  Patient also had a CT performed yesterday that showed stable bilateral pulmonary nodules but a new large necrotic subcarinal lymph node.        Review of Systems   Constitutional: Negative.    Respiratory:  Positive for cough. Negative for shortness of breath and wheezing.    Cardiovascular: Negative.           Objective   Physical Exam     Gen:  No acute distress.   Eyes: EOMI. Anicteric sclera. No conjunctival injection.   ENT: No discharge.External appearance of ears and nose normal.  Neck: Trachea midline.

## 2025-06-02 DIAGNOSIS — R59.0 MEDIASTINAL LYMPHADENOPATHY: Primary | ICD-10-CM

## 2025-06-30 ENCOUNTER — OFFICE VISIT (OUTPATIENT)
Dept: PULMONOLOGY | Age: 70
End: 2025-06-30
Payer: MEDICARE

## 2025-06-30 VITALS
TEMPERATURE: 97.7 F | WEIGHT: 182 LBS | HEART RATE: 60 BPM | DIASTOLIC BLOOD PRESSURE: 70 MMHG | HEIGHT: 69 IN | BODY MASS INDEX: 26.96 KG/M2 | RESPIRATION RATE: 18 BRPM | OXYGEN SATURATION: 94 % | SYSTOLIC BLOOD PRESSURE: 110 MMHG

## 2025-06-30 DIAGNOSIS — J45.40 MODERATE PERSISTENT ASTHMA WITHOUT COMPLICATION: ICD-10-CM

## 2025-06-30 DIAGNOSIS — J31.0 CHRONIC RHINITIS: ICD-10-CM

## 2025-06-30 DIAGNOSIS — B37.0 ORAL THRUSH: Primary | ICD-10-CM

## 2025-06-30 DIAGNOSIS — K21.9 GASTROESOPHAGEAL REFLUX DISEASE WITHOUT ESOPHAGITIS: ICD-10-CM

## 2025-06-30 PROCEDURE — G8427 DOCREV CUR MEDS BY ELIG CLIN: HCPCS | Performed by: INTERNAL MEDICINE

## 2025-06-30 PROCEDURE — 1036F TOBACCO NON-USER: CPT | Performed by: INTERNAL MEDICINE

## 2025-06-30 PROCEDURE — 3017F COLORECTAL CA SCREEN DOC REV: CPT | Performed by: INTERNAL MEDICINE

## 2025-06-30 PROCEDURE — 3078F DIAST BP <80 MM HG: CPT | Performed by: INTERNAL MEDICINE

## 2025-06-30 PROCEDURE — 99214 OFFICE O/P EST MOD 30 MIN: CPT | Performed by: INTERNAL MEDICINE

## 2025-06-30 PROCEDURE — G8417 CALC BMI ABV UP PARAM F/U: HCPCS | Performed by: INTERNAL MEDICINE

## 2025-06-30 PROCEDURE — 1123F ACP DISCUSS/DSCN MKR DOCD: CPT | Performed by: INTERNAL MEDICINE

## 2025-06-30 PROCEDURE — 3074F SYST BP LT 130 MM HG: CPT | Performed by: INTERNAL MEDICINE

## 2025-06-30 PROCEDURE — G2211 COMPLEX E/M VISIT ADD ON: HCPCS | Performed by: INTERNAL MEDICINE

## 2025-06-30 PROCEDURE — 1159F MED LIST DOCD IN RCRD: CPT | Performed by: INTERNAL MEDICINE

## 2025-06-30 RX ORDER — FLUTICASONE PROPIONATE 50 MCG
2 SPRAY, SUSPENSION (ML) NASAL DAILY
Qty: 16 G | Refills: 5 | Status: SHIPPED | OUTPATIENT
Start: 2025-06-30

## 2025-06-30 RX ORDER — NYSTATIN 100000 [USP'U]/ML
500000 SUSPENSION ORAL 4 TIMES DAILY
Qty: 280 ML | Refills: 0 | Status: SHIPPED | OUTPATIENT
Start: 2025-06-30 | End: 2025-07-14

## 2025-06-30 RX ORDER — FLUCONAZOLE 150 MG/1
150 TABLET ORAL ONCE
Qty: 1 TABLET | Refills: 0 | Status: SHIPPED | OUTPATIENT
Start: 2025-06-30 | End: 2025-06-30

## 2025-07-16 ENCOUNTER — OFFICE VISIT (OUTPATIENT)
Dept: FAMILY MEDICINE CLINIC | Age: 70
End: 2025-07-16

## 2025-07-16 VITALS
DIASTOLIC BLOOD PRESSURE: 64 MMHG | SYSTOLIC BLOOD PRESSURE: 98 MMHG | OXYGEN SATURATION: 93 % | TEMPERATURE: 97.6 F | BODY MASS INDEX: 27.31 KG/M2 | RESPIRATION RATE: 12 BRPM | WEIGHT: 185 LBS | HEART RATE: 61 BPM

## 2025-07-16 DIAGNOSIS — J32.9 CHRONIC SINUSITIS, UNSPECIFIED LOCATION: Primary | ICD-10-CM

## 2025-07-16 DIAGNOSIS — J30.2 SEASONAL ALLERGIC RHINITIS, UNSPECIFIED TRIGGER: ICD-10-CM

## 2025-07-16 RX ORDER — CETIRIZINE HYDROCHLORIDE 10 MG/1
10 TABLET ORAL DAILY
Qty: 30 TABLET | Refills: 11 | Status: SHIPPED | OUTPATIENT
Start: 2025-07-16

## 2025-07-16 RX ORDER — PREDNISONE 10 MG/1
10 TABLET ORAL 2 TIMES DAILY
Qty: 10 TABLET | Refills: 0 | Status: SHIPPED | OUTPATIENT
Start: 2025-07-16 | End: 2025-07-21

## 2025-07-16 NOTE — PATIENT INSTRUCTIONS
INSTRUCTIONS  Please schedule fasting annual physical (30 minutes) in 3 months.  OK to have water and medications (except for diabetes medicines)    Ok to take tessalon perles for cough.  Take steroid for 5 days.  Start zyrtec allergy pill.

## 2025-07-16 NOTE — PROGRESS NOTES
PROBLEM VISIT NOTE   Assessment and Plan:      Diagnosis Orders   1. Chronic sinusitis, unspecified location  predniSONE (DELTASONE) 10 MG tablet      2. Seasonal allergic rhinitis, unspecified trigger  cetirizine (ZYRTEC) 10 MG tablet    predniSONE (DELTASONE) 10 MG tablet         INSTRUCTIONS  Please schedule fasting annual physical (30 minutes) in 3 months.  OK to have water and medications (except for diabetes medicines)    Ok to take tessalon perles for cough.  Take steroid for 5 days.  Start zyrtec allergy pill.     Subjective:     Chief Complaint   Patient presents with    Sinus Problem     Sinus pressure, headaches, drainage, non-productive cough for 3 weeks     Marcelo Hdez is a 69 y.o. male who presents nasal congestion and face pain, temple HA  Duration 3 wks  Associated with Post nasal drip noted. Nasal all clear or white  Denies fever, shortness of breath   Tried nasal rinse, got worse    CHART REVIEW   reports that he quit smoking about 5 years ago. His smoking use included cigarettes. He started smoking about 61 years ago. He has a 28.2 pack-year smoking history. He has been exposed to tobacco smoke. He has never used smokeless tobacco.  Health Maintenance Due   Topic Date Due    Shingles vaccine (2 of 3) 09/10/2015    Respiratory Syncytial Virus (RSV) Pregnant or age 60 yrs+ (1 - Risk 60-74 years 1-dose series) Never done    Pneumococcal 50+ years Vaccine (2 of 2 - PCV) 01/06/2017    COVID-19 Vaccine (4 - 2024-25 season) 09/01/2024    Annual Wellness Visit (Medicare)  03/09/2025    DTaP/Tdap/Td vaccine (4 - Td or Tdap) 07/16/2025     Current Outpatient Medications   Medication Instructions    albuterol sulfate HFA (VENTOLIN HFA) 108 (90 Base) MCG/ACT inhaler 2 puffs, EVERY 6 HOURS PRN    cetirizine (ZYRTEC) 10 mg, Oral, DAILY    docusate sodium (COLACE) 100 mg, Oral, 2 TIMES DAILY, (Stool softener)    fluticasone (FLONASE) 50 MCG/ACT nasal spray 2 sprays, Each Nostril, DAILY

## 2025-09-02 ENCOUNTER — TELEPHONE (OUTPATIENT)
Dept: FAMILY MEDICINE CLINIC | Age: 70
End: 2025-09-02

## 2025-09-05 ENCOUNTER — HOSPITAL ENCOUNTER (OUTPATIENT)
Dept: CT IMAGING | Age: 70
Discharge: HOME | End: 2025-09-05
Attending: INTERNAL MEDICINE
Payer: MEDICARE

## 2025-09-05 ENCOUNTER — OFFICE VISIT (OUTPATIENT)
Dept: FAMILY MEDICINE CLINIC | Age: 70
End: 2025-09-05

## 2025-09-05 VITALS
OXYGEN SATURATION: 92 % | WEIGHT: 181.2 LBS | HEIGHT: 69 IN | DIASTOLIC BLOOD PRESSURE: 60 MMHG | HEART RATE: 86 BPM | TEMPERATURE: 98.5 F | BODY MASS INDEX: 26.84 KG/M2 | RESPIRATION RATE: 20 BRPM | SYSTOLIC BLOOD PRESSURE: 114 MMHG

## 2025-09-05 DIAGNOSIS — R73.03 PREDIABETES: ICD-10-CM

## 2025-09-05 DIAGNOSIS — E55.9 VITAMIN D DEFICIENCY: ICD-10-CM

## 2025-09-05 DIAGNOSIS — I10 ESSENTIAL HYPERTENSION: ICD-10-CM

## 2025-09-05 DIAGNOSIS — R09.02 HYPOXIA: ICD-10-CM

## 2025-09-05 DIAGNOSIS — E78.5 HYPERLIPIDEMIA LDL GOAL <130: ICD-10-CM

## 2025-09-05 DIAGNOSIS — R06.09 DOE (DYSPNEA ON EXERTION): ICD-10-CM

## 2025-09-05 DIAGNOSIS — J41.0 SIMPLE CHRONIC BRONCHITIS (HCC): ICD-10-CM

## 2025-09-05 DIAGNOSIS — J45.41 MODERATE PERSISTENT ASTHMA WITH ACUTE EXACERBATION: Primary | ICD-10-CM

## 2025-09-05 DIAGNOSIS — R59.0 MEDIASTINAL LYMPHADENOPATHY: ICD-10-CM

## 2025-09-05 PROCEDURE — 71250 CT THORAX DX C-: CPT

## 2025-09-05 RX ORDER — IPRATROPIUM BROMIDE AND ALBUTEROL SULFATE 2.5; .5 MG/3ML; MG/3ML
1 SOLUTION RESPIRATORY (INHALATION) EVERY 4 HOURS PRN
Qty: 360 ML | Refills: 3 | Status: SHIPPED | OUTPATIENT
Start: 2025-09-05

## 2025-09-05 RX ORDER — PREDNISONE 10 MG/1
TABLET ORAL
Qty: 35 TABLET | Refills: 0 | Status: SHIPPED | OUTPATIENT
Start: 2025-09-05

## (undated) DEVICE — GENERAL LAPAROSCOPY: Brand: MEDLINE INDUSTRIES, INC.

## (undated) DEVICE — SUTURE ABSORBABLE L 18 IN SZ 4-0 NDL L 19 MM POLYGLACTIN 910 36/BX

## (undated) DEVICE — ADTEC SINGLE USE HOOK SCISSORS, SHAFT ONLY, MONOPOLAR, STRAIGHT, WORKING LENGTH: 12 1/4", (310 MM), DIAM. 5 MM, BLUNT/BLUNT, INSULATED, SINGLE ACTION, STERILE, DISPOSABLE, PACKAGE OF 10 PIECES: Brand: AESCULAP

## (undated) DEVICE — MERCY HEALTH WEST TURNOVER: Brand: MEDLINE INDUSTRIES, INC.

## (undated) DEVICE — TISSUE RETRIEVAL SYSTEM: Brand: INZII RETRIEVAL SYSTEM

## (undated) DEVICE — TROCAR: Brand: KII SHIELDED BLADED ACCESS SYSTEM

## (undated) DEVICE — GLOVE ORANGE PI 7 1/2   MSG9075

## (undated) DEVICE — SUTURE VICRYL + SZ 0 L27IN ABSRB VLT L26MM UR-6 5/8 CIR VCP603H

## (undated) DEVICE — APPLIER CLP M L L11.4IN DIA10MM ENDOSCP ROT MULT FOR LIG

## (undated) DEVICE — SYRINGE MED 30ML STD CLR PLAS LUERLOCK TIP N CTRL DISP

## (undated) DEVICE — PUMP SUC IRR TBNG L10FT W/ HNDPC ASSEMB STRYKEFLOW 2

## (undated) DEVICE — ELECTRODE PT RET AD L9FT HI MOIST COND ADH HYDRGEL CORDED

## (undated) DEVICE — GLOVE SURG SZ 8 L12IN FNGR THK79MIL GRN LTX FREE

## (undated) DEVICE — GARMENT COMPR STD FOR 17IN CALF UNIF THER FLOTRN

## (undated) DEVICE — AGENT HEMSTAT 3GM PURIFIED PLNT STARCH PWD ABSRB ARISTA AH

## (undated) DEVICE — LIQUIBAND RAPID ADHESIVE 36/CS 0.8ML: Brand: MEDLINE

## (undated) DEVICE — TROCAR: Brand: KII SLEEVE

## (undated) DEVICE — INSUFFLATION NEEDLE TO ESTABLISH PNEUMOPERITONEUM.: Brand: INSUFFLATION NEEDLE

## (undated) DEVICE — APPLICATOR SURG XL L38CM FOR ARISTA ABSRB HEMSTAT FLEXITIP

## (undated) DEVICE — SYRINGE MED 20ML STD CLR PLAS LUERLOCK TIP N CTRL DISP

## (undated) DEVICE — TRANSFER SET 3": Brand: MEDLINE INDUSTRIES, INC.

## (undated) DEVICE — GOWN,AURORA,NONREINF,RAGLAN,XXL,STERILE: Brand: MEDLINE

## (undated) DEVICE — COVER LT HNDL BLU PLAS

## (undated) DEVICE — LOOP LIG SUT SZ 0 L18IN ABSRB POLYDIOXANONE MFIL PDS II